# Patient Record
Sex: FEMALE | Race: WHITE | Employment: FULL TIME | URBAN - METROPOLITAN AREA
[De-identification: names, ages, dates, MRNs, and addresses within clinical notes are randomized per-mention and may not be internally consistent; named-entity substitution may affect disease eponyms.]

---

## 2018-01-31 ENCOUNTER — HOSPITAL ENCOUNTER (OUTPATIENT)
Dept: LAB | Age: 28
Discharge: HOME | End: 2018-01-31

## 2018-01-31 ENCOUNTER — OFFICE VISIT (OUTPATIENT)
Dept: OBGYN CLINIC | Age: 28
End: 2018-01-31

## 2018-01-31 VITALS
TEMPERATURE: 99.4 F | RESPIRATION RATE: 18 BRPM | DIASTOLIC BLOOD PRESSURE: 89 MMHG | HEIGHT: 64 IN | WEIGHT: 256.8 LBS | SYSTOLIC BLOOD PRESSURE: 124 MMHG | BODY MASS INDEX: 43.84 KG/M2 | HEART RATE: 78 BPM

## 2018-01-31 DIAGNOSIS — R87.610 ASCUS WITH POSITIVE HIGH RISK HPV CERVICAL: Primary | ICD-10-CM

## 2018-01-31 DIAGNOSIS — R87.810 ASCUS WITH POSITIVE HIGH RISK HPV CERVICAL: Primary | ICD-10-CM

## 2018-01-31 PROBLEM — E66.01 OBESITY, MORBID (HCC): Status: ACTIVE | Noted: 2018-01-31

## 2018-01-31 NOTE — PROCEDURES
Procedure Note: Colposcopy    Rashad Tsang is a No obstetric history on file. ,  32 y.o. female [facility] Patient's last menstrual period was 01/17/2018. She presents for colposcopy for evaluation of a cervical abnormality with a pap smear abnormality consisting of ASCUS and High risk HPV positive. After being presented with the risks, benefits and alternatives has she signed a consent for the procedure. She states that she understands the need for the procedure and has no further questions. She was informed that she may experience discomfort. Patient has no prior abnormal pap smears. She is a smoker, but is trying to [place]. Procedure:  She was positioned in the dorsal lithotomy position and a speculum was inserted into the vagina. Dilute acetic acid was applied to the cervix. The colposcope was used to visualize the cervix. Procedure: The transformation zone was completely visualized. Findings: This colposcopy was satisfactory. The procedure was notable for white epithelium on the cervix. Acetowhite changes were noted from around 12 to around 7 o'clock on the cervix. There were areas of hyperkeratosis and mosaicism at around 1 and 5 o'clock. There were several areas of acetowhite far from the transformation zone on the anterior lip of the cervix. Biopsies were taken from the cervix at 1 and 5 o'clock. Monsels was applied to the cervix. Hemostasis was noted to be excellent. Endocervical currettage: An endocervical curettage was performed. Post Procedure Status: The patient tolerated the procedure well with minimal discomfort. She was observed for 10 minutes and released in good condition. Will call patient with results and plan.

## 2018-01-31 NOTE — PROGRESS NOTES
Procedure Note: Colposcopy    Yelena Arnold is a No obstetric history on file. ,  32 y.o. female [facility] Patient's last menstrual period was 01/17/2018. She presents for colposcopy for evaluation of a cervical abnormality with a pap smear abnormality consisting of ASCUS and High risk HPV positive. After being presented with the risks, benefits and alternatives has she signed a consent for the procedure. She states that she understands the need for the procedure and has no further questions. She was informed that she may experience discomfort. Patient has no prior abnormal pap smears. She is a smoker, but is trying to [place]. Procedure:  She was positioned in the dorsal lithotomy position and a speculum was inserted into the vagina. Dilute acetic acid was applied to the cervix. The colposcope was used to visualize the cervix. Procedure: The transformation zone was completely visualized. Findings: This colposcopy was satisfactory. The procedure was notable for white epithelium on the cervix. Acetowhite changes were noted from around 12 to around 7 o'clock on the cervix. There were areas of hyperkeratosis and mosaicism at around 1 and 5 o'clock. There were several areas of acetowhite far from the transformation zone on the anterior lip of the cervix. Biopsies were taken from the cervix at 1 and 5 o'clock. Monsels was applied to the cervix. Hemostasis was noted to be excellent. Endocervical currettage: An endocervical curettage was performed. Post Procedure Status: The patient tolerated the procedure well with minimal discomfort. She was observed for 10 minutes and released in good condition. Will call patient with results and plan.

## 2018-01-31 NOTE — PROGRESS NOTES
Chief Complaint   Patient presents with    Colposcopy     Pt states \"bump on vagina\" for one week. Pt also states +Hpv. Pt can't keep weight off, hair growth, cycles somewhat irregular. Pt states she may have PCOS. Pregnancy test done negative results.

## 2018-02-01 LAB
HCG URINE, QL. (POC): NEGATIVE
VALID INTERNAL CONTROL?: YES

## 2018-02-13 NOTE — PROGRESS NOTES
Results of colposcopic biopsies showed low grade changes. Will repeat cotesting in 12 months. Thank you.

## 2018-02-14 ENCOUNTER — TELEPHONE (OUTPATIENT)
Dept: OBGYN CLINIC | Age: 28
End: 2018-02-14

## 2018-02-14 NOTE — TELEPHONE ENCOUNTER
Results of colposcopic biopsies showed low grade changes. Will repeat cotesting in 12 months. Unable to contact pt by phone. Letter sent.

## 2018-02-23 ENCOUNTER — TELEPHONE (OUTPATIENT)
Dept: OBGYN CLINIC | Age: 28
End: 2018-02-23

## 2018-08-31 ENCOUNTER — HOSPITAL ENCOUNTER (EMERGENCY)
Age: 28
Discharge: HOME | End: 2018-08-31
Attending: EMERGENCY MEDICINE
Payer: SELF-PAY

## 2018-08-31 ENCOUNTER — APPOINTMENT (OUTPATIENT)
Dept: GENERAL RADIOLOGY | Age: 28
End: 2018-08-31
Attending: PHYSICIAN ASSISTANT
Payer: SELF-PAY

## 2018-08-31 VITALS
TEMPERATURE: 98 F | DIASTOLIC BLOOD PRESSURE: 95 MMHG | OXYGEN SATURATION: 99 % | RESPIRATION RATE: 17 BRPM | BODY MASS INDEX: 45.05 KG/M2 | HEIGHT: 64 IN | HEART RATE: 91 BPM | WEIGHT: 263.89 LBS | SYSTOLIC BLOOD PRESSURE: 148 MMHG

## 2018-08-31 DIAGNOSIS — S39.012A BACK STRAIN, INITIAL ENCOUNTER: ICD-10-CM

## 2018-08-31 DIAGNOSIS — M54.6 ACUTE MIDLINE THORACIC BACK PAIN: Primary | ICD-10-CM
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
## 2018-08-31 DIAGNOSIS — R51.9 ACUTE NONINTRACTABLE HEADACHE, UNSPECIFIED HEADACHE TYPE: ICD-10-CM

## 2018-08-31 PROCEDURE — 74011250637 HC RX REV CODE- 250/637: Performed by: PHYSICIAN ASSISTANT

## 2018-08-31 PROCEDURE — 72072 X-RAY EXAM THORAC SPINE 3VWS: CPT

## 2018-08-31 PROCEDURE — 99283 EMERGENCY DEPT VISIT LOW MDM: CPT

## 2018-08-31 RX ORDER — CYCLOBENZAPRINE HCL 10 MG
10 TABLET ORAL
Status: COMPLETED | OUTPATIENT
Start: 2018-08-31 | End: 2018-08-31

## 2018-08-31 RX ORDER — HYDROCODONE BITARTRATE AND ACETAMINOPHEN 5; 325 MG/1; MG/1
1 TABLET ORAL
Status: COMPLETED | OUTPATIENT
Start: 2018-08-31 | End: 2018-08-31

## 2018-08-31 RX ORDER — METHOCARBAMOL 500 MG/1
500 TABLET, FILM COATED ORAL 4 TIMES DAILY
Qty: 20 TAB | Refills: 0 | Status: SHIPPED | OUTPATIENT
Start: 2018-08-31 | End: 2018-09-11 | Stop reason: ALTCHOICE

## 2018-08-31 RX ORDER — NAPROXEN 500 MG/1
500 TABLET ORAL 2 TIMES DAILY WITH MEALS
Qty: 20 TAB | Refills: 0 | Status: SHIPPED | OUTPATIENT
Start: 2018-08-31 | End: 2018-09-11 | Stop reason: SDUPTHER

## 2018-08-31 RX ORDER — NAPROXEN 250 MG/1
500 TABLET ORAL
Status: COMPLETED | OUTPATIENT
Start: 2018-08-31 | End: 2018-08-31

## 2018-08-31 RX ORDER — HYDROCODONE BITARTRATE AND ACETAMINOPHEN 5; 325 MG/1; MG/1
1 TABLET ORAL
Qty: 10 TAB | Refills: 0 | Status: SHIPPED | OUTPATIENT
Start: 2018-08-31 | End: 2018-09-11 | Stop reason: ALTCHOICE

## 2018-08-31 RX ADMIN — HYDROCODONE BITARTRATE AND ACETAMINOPHEN 1 TABLET: 5; 325 TABLET ORAL at 14:49

## 2018-08-31 RX ADMIN — CYCLOBENZAPRINE 10 MG: 10 TABLET, FILM COATED ORAL at 14:49

## 2018-08-31 RX ADMIN — NAPROXEN 500 MG: 250 TABLET ORAL at 14:49

## 2018-08-31 NOTE — DISCHARGE INSTRUCTIONS
Thank you!     Thank you for allowing us to provide you with excellent care today. We hope we addressed all of your concerns and needs. We strive to provide excellent quality care in the Emergency Department. You will receive a survey after your visit to evaluate the care you were provided.      Please rate us a level 5 (excellent), as anything less than excellent does not meet our goals.      If you feel that you have not received excellent quality care or timely care, please ask to speak to the nurse manager. Please choose us in the future for your continued health care needs. ______________________________________________________________________    The exam and treatment you received in the Emergency Department were for an urgent problem and are not intended as complete care. It is important that you follow-up with a doctor, nurse practitioner, or physician assistant to:  (1) confirm your diagnosis,  (2) re-evaluation of changes in your illness and treatment, and  (3) for ongoing care. If your symptoms become worse or you do not improve as expected and you are unable to reach your usual health care provider, you should return to the Emergency Department. We are available 24 hours a day. Take this sheet with you when you go to your follow-up visit. If you have any problem arranging the follow-up visit, contact [address] [place] [address] 21 [phone])    Make an appointment with your Primary Care doctor for follow up of this visit. Return to the ER if you are unable to be seen in the time recommended on your discharge instructions. Back Pain: Care Instructions  Your Care Instructions    Back pain has many possible causes. It is often related to problems with muscles and ligaments of the back. It may also be related to problems with the nerves, discs, or bones of the back. Moving, lifting, standing, sitting, or sleeping in an awkward way can strain the back. Sometimes you don't notice the injury until later.  Arthritis is another common cause of back pain. Although it may hurt a lot, back pain usually improves on its own within several weeks. Most people recover in 12 weeks or less. Using good home treatment and being careful not to stress your back can help you feel better sooner. Follow-up care is a key part of your treatment and safety. Be sure to make and go to all appointments, and call your doctor if you are having problems. It's also a good idea to know your test results and keep a list of the medicines you take. How can you care for yourself at home? · Sit or lie in positions that are most comfortable and reduce your pain. Try one of these positions when you lie down:  ¨ Lie on your back with your knees bent and supported by large pillows. ¨ Lie on the floor with your legs on the seat of a sofa or chair. Jolyne Laser on your side with your knees and hips bent and a pillow between your legs. ¨ Lie on your stomach if it does not make pain worse. · Do not sit up in bed, and avoid soft couches and twisted positions. Bed rest can help relieve pain at first, but it delays healing. Avoid bed rest after the first day of back pain. · Change positions every 30 minutes. If you must sit for long periods of time, take breaks from sitting. Get up and walk around, or lie in a comfortable position. · Try using a heating pad on a low or medium setting for 15 to 20 minutes every 2 or 3 hours. Try a warm shower in place of one session with the heating pad. · You can also try an ice pack for 10 to 15 minutes every 2 to 3 hours. Put a thin cloth between the ice pack and your skin. · Take pain medicines exactly as directed. ¨ If the doctor gave you a prescription medicine for pain, take it as prescribed. ¨ If you are not taking a prescription pain medicine, ask your doctor if you can take an over-the-counter medicine. · Take short walks several times a day.  You can start with 5 to 10 minutes, 3 or 4 times a day, and work up to longer walks. Walk on level surfaces and avoid hills and stairs until your back is better. · Return to work and other activities as soon as you can. Continued rest without activity is usually not good for your back. · To prevent future back pain, do exercises to stretch and strengthen your back and stomach. Learn how to use good posture, safe lifting techniques, and proper body mechanics. When should you call for help? Call your doctor now or seek immediate medical care if:    · You have new or worsening numbness in your legs.     · You have new or worsening weakness in your legs. (This could make it hard to stand up.)     · You lose control of your bladder or bowels.    Watch closely for changes in your health, and be sure to contact your doctor if:    · You have a fever, lose weight, or don't feel well.     · You do not get better as expected. Where can you learn more? Go to http://ellie-oscar.info/. Enter O174 in the search box to learn more about \"Back Pain: Care Instructions. \"  Current as of: November 29, 2017  Content Version: 11.7  © 6081-5020 [employer]. Care instructions adapted under license by [employer] (which disclaims liability or warranty for this information). If you have questions about a medical condition or this instruction, always ask your healthcare professional. [address] any warranty or liability for your use of this information.

## 2018-08-31 NOTE — LETTER
[address] 
[facility] EMERGENCY DEPT 
[address] [place] [address] [address] [id number] 
[phone] Work/School Note Date: 8/31/2018 To Whom It May concern: 
 
Néstor Packer was seen and treated today in the emergency room by the following provider(s): 
Attending Provider: Eden Cheek MD 
Physician Assistant: Gaurang Walter PA-C. Néstor Packer may return to work on 3 September 2018. Sincerely, Gaurang Walter PA-C
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
## 2018-08-31 NOTE — ED NOTES
Discharge instructions reviewed with patient, copy given by EDWARD Trejo. Pt is accomponied by family, denies use of wheelchair.

## 2018-09-11 ENCOUNTER — OFFICE VISIT (OUTPATIENT)
Dept: FAMILY MEDICINE CLINIC | Age: 28
End: 2018-09-11

## 2018-09-11 VITALS
RESPIRATION RATE: 20 BRPM | HEIGHT: 64 IN | SYSTOLIC BLOOD PRESSURE: 135 MMHG | WEIGHT: 268 LBS | OXYGEN SATURATION: 98 % | DIASTOLIC BLOOD PRESSURE: 89 MMHG | TEMPERATURE: 98.1 F | BODY MASS INDEX: 45.75 KG/M2 | HEART RATE: 83 BPM

## 2018-09-11 DIAGNOSIS — S29.012D UPPER BACK STRAIN, SUBSEQUENT ENCOUNTER: Primary | ICD-10-CM

## 2018-09-11 DIAGNOSIS — M79.601 PAIN, ARM, RIGHT: ICD-10-CM

## 2018-09-11 DIAGNOSIS — M25.532 PAIN, WRIST, LEFT: ICD-10-CM

## 2018-09-11 RX ORDER — NAPROXEN 500 MG/1
500 TABLET ORAL 2 TIMES DAILY WITH MEALS
Qty: 20 TAB | Refills: 0 | Status: SHIPPED | OUTPATIENT
Start: 2018-09-11 | End: 2018-09-21

## 2018-09-11 RX ORDER — METHOCARBAMOL 500 MG/1
500 TABLET, FILM COATED ORAL 4 TIMES DAILY
Qty: 20 TAB | Refills: 0 | Status: SHIPPED | OUTPATIENT
Start: 2018-09-11 | End: 2018-09-26 | Stop reason: SDUPTHER

## 2018-09-11 RX ORDER — HYDROCODONE BITARTRATE AND ACETAMINOPHEN 5; 325 MG/1; MG/1
1 TABLET ORAL
Qty: 10 TAB | Refills: 0 | Status: SHIPPED | OUTPATIENT
Start: 2018-09-11 | End: 2018-09-26 | Stop reason: SDUPTHER
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
## 2018-09-11 NOTE — PATIENT INSTRUCTIONS
Whiplash: Care Instructions Your Care Instructions Whiplash occurs when your head is suddenly forced forward and then snapped backward, as might happen in a car accident or sports injury. This can cause pain and stiffness in your neck. Your head, chest, shoulders, and arms also may hurt. Most whiplash gets better with home care. Your doctor may advise you to take medicine to relieve pain or relax your muscles. He or she may suggest exercise and physical therapy to increase flexibility and relieve pain. You can try wearing a neck (cervical) collar to support your neck. For a while you probably will need to avoid lifting and other activities that can strain the neck. Follow-up care is a key part of your treatment and safety. Be sure to make and go to all appointments, and call your doctor if you are having problems. It's also a good idea to know your test results and keep a list of the medicines you take. How can you care for yourself at home? · Take pain medicines exactly as directed. ¨ If the doctor gave you a prescription medicine for pain, take it as prescribed. ¨ If you are not taking a prescription pain medicine, ask your doctor if you can take an over-the-counter medicine. ¨ Do not take two or more pain medicines at the same time unless the doctor told you to. Many pain medicines have acetaminophen, which is Tylenol. Too much acetaminophen (Tylenol) can be harmful. · You can try using a soft foam collar to support your neck for short periods of time. You can buy one at most drugstores. Do not wear the collar more than 2 or 3 days unless your doctor tells you to. · You can try using heat and ice to see if it helps. ¨ Try using a heating pad on a low or medium setting for 15 to 20 minutes every 2 to 3 hours. Try a warm shower in place of one session with the heating pad. You can also buy single-use heat wraps that last up to 8 hours. ¨ You can also try an ice pack for 10 to 15 minutes every 2 to 3 hours. · Do not do anything that makes the pain worse. Take it easy for a couple of days. You can do your usual activities if they do not hurt your neck or put it at risk for more stress or injury. Avoid lifting, sports, or other activities that might strain your neck. · Try sleeping on a special neck pillow. Place it under your neck, not under your head. Placing a tightly rolled-up towel under your neck while you sleep will also work. If you use a neck pillow or rolled towel, do not use your regular pillow at the same time. · Once your neck pain is gone, do exercises to stretch your neck and back and make them stronger. Your doctor or physical therapist can tell you which exercises are best. 
When should you call for help? Call 911 anytime you think you may need emergency care. For example, call if: 
  · You are unable to move an arm or a leg at all.  
[facility] your doctor now or seek immediate medical care if: 
  · You have new or worse symptoms in your arms, legs, chest, belly, or buttocks. Symptoms may include: ¨ Numbness or tingling. ¨ Weakness. ¨ Pain.  
  · You lose bladder or bowel control.  
 Watch closely for changes in your health, and be sure to contact your doctor if: 
  · You are not getting better as expected. Where can you learn more? Go to http://ellie-oscar.info/. Enter H289 in the search box to learn more about \"Whiplash: Care Instructions. \" Current as of: November 29, 2017 Content Version: 11.7 © 7409-8733 [employer]. Care instructions adapted under license by [employer] (which disclaims liability or warranty for this information). If you have questions about a medical condition or this instruction, always ask your healthcare professional. [address] any warranty or liability for your use of this information.
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
## 2018-09-11 NOTE — LETTER
NOTIFICATION RETURN TO WORK / SCHOOL 
 
9/11/2018 2:56 PM 
 
Ms. Les James [address] [place] [id number] To Whom It May Concern: 
 
Les James is currently under the care of [facility]. She was seen in the office today. If there are questions or concerns please have the patient contact our office. Sincerely, Elisa Soto MD

## 2018-09-26 ENCOUNTER — OFFICE VISIT (OUTPATIENT)
Dept: FAMILY MEDICINE CLINIC | Age: 28
End: 2018-09-26

## 2018-09-26 VITALS
SYSTOLIC BLOOD PRESSURE: 130 MMHG | BODY MASS INDEX: 46.61 KG/M2 | WEIGHT: 273 LBS | TEMPERATURE: 97.9 F | HEIGHT: 64 IN | DIASTOLIC BLOOD PRESSURE: 84 MMHG | OXYGEN SATURATION: 99 % | RESPIRATION RATE: 20 BRPM | HEART RATE: 98 BPM

## 2018-09-26 DIAGNOSIS — S29.012D UPPER BACK STRAIN, SUBSEQUENT ENCOUNTER: ICD-10-CM

## 2018-09-26 DIAGNOSIS — M25.521 RIGHT ELBOW PAIN: Primary | ICD-10-CM

## 2018-09-26 DIAGNOSIS — M25.532 PAIN, WRIST, LEFT: ICD-10-CM

## 2018-09-26 DIAGNOSIS — M79.601 PAIN, ARM, RIGHT: ICD-10-CM

## 2018-09-26 RX ORDER — METHOCARBAMOL 500 MG/1
500 TABLET, FILM COATED ORAL 4 TIMES DAILY
Qty: 20 TAB | Refills: 0 | Status: SHIPPED | OUTPATIENT
Start: 2018-09-26 | End: 2018-10-09 | Stop reason: SDUPTHER

## 2018-09-26 RX ORDER — NAPROXEN 500 MG/1
500 TABLET ORAL 2 TIMES DAILY
Qty: 120 TAB | Refills: 0 | Status: SHIPPED | OUTPATIENT
Start: 2018-09-26 | End: 2018-10-09 | Stop reason: SDUPTHER

## 2018-09-26 RX ORDER — HYDROCODONE BITARTRATE AND ACETAMINOPHEN 5; 325 MG/1; MG/1
1 TABLET ORAL
Qty: 10 TAB | Refills: 0 | Status: SHIPPED | OUTPATIENT
Start: 2018-09-26 | End: 2022-05-22
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
## 2018-09-26 NOTE — LETTER
NOTIFICATION RETURN TO WORK / SCHOOL 
 
9/26/2018 4:01 PM 
 
Ms. Emili Laura [address] [place] [id number] To Whom It May Concern: 
 
Emili Laura is currently under the care of [facility]. She was seen in the office today. If there are questions or concerns please have the patient contact our office. Sincerely, Terra Hutchison MD

## 2018-09-26 NOTE — PATIENT INSTRUCTIONS
Whiplash: Care Instructions Your Care Instructions Whiplash occurs when your head is suddenly forced forward and then snapped backward, as might happen in a car accident or sports injury. This can cause pain and stiffness in your neck. Your head, chest, shoulders, and arms also may hurt. Most whiplash gets better with home care. Your doctor may advise you to take medicine to relieve pain or relax your muscles. He or she may suggest exercise and physical therapy to increase flexibility and relieve pain. You can try wearing a neck (cervical) collar to support your neck. For a while you probably will need to avoid lifting and other activities that can strain the neck. Follow-up care is a key part of your treatment and safety. Be sure to make and go to all appointments, and call your doctor if you are having problems. It's also a good idea to know your test results and keep a list of the medicines you take. How can you care for yourself at home? · Take pain medicines exactly as directed. ¨ If the doctor gave you a prescription medicine for pain, take it as prescribed. ¨ If you are not taking a prescription pain medicine, ask your doctor if you can take an over-the-counter medicine. ¨ Do not take two or more pain medicines at the same time unless the doctor told you to. Many pain medicines have acetaminophen, which is Tylenol. Too much acetaminophen (Tylenol) can be harmful. · You can try using a soft foam collar to support your neck for short periods of time. You can buy one at most drugstores. Do not wear the collar more than 2 or 3 days unless your doctor tells you to. · You can try using heat and ice to see if it helps. ¨ Try using a heating pad on a low or medium setting for 15 to 20 minutes every 2 to 3 hours. Try a warm shower in place of one session with the heating pad. You can also buy single-use heat wraps that last up to 8 hours. ¨ You can also try an ice pack for 10 to 15 minutes every 2 to 3 hours. · Do not do anything that makes the pain worse. Take it easy for a couple of days. You can do your usual activities if they do not hurt your neck or put it at risk for more stress or injury. Avoid lifting, sports, or other activities that might strain your neck. · Try sleeping on a special neck pillow. Place it under your neck, not under your head. Placing a tightly rolled-up towel under your neck while you sleep will also work. If you use a neck pillow or rolled towel, do not use your regular pillow at the same time. · Once your neck pain is gone, do exercises to stretch your neck and back and make them stronger. Your doctor or physical therapist can tell you which exercises are best. 
When should you call for help? Call 911 anytime you think you may need emergency care. For example, call if: 
  · You are unable to move an arm or a leg at all.  
[facility] your doctor now or seek immediate medical care if: 
  · You have new or worse symptoms in your arms, legs, chest, belly, or buttocks. Symptoms may include: ¨ Numbness or tingling. ¨ Weakness. ¨ Pain.  
  · You lose bladder or bowel control.  
 Watch closely for changes in your health, and be sure to contact your doctor if: 
  · You are not getting better as expected. Where can you learn more? Go to http://ellie-oscar.info/. Enter M499 in the search box to learn more about \"Whiplash: Care Instructions. \" Current as of: November 29, 2017 Content Version: 11.7 © 3384-7016 [employer]. Care instructions adapted under license by [employer] [facility] (which disclaims liability or warranty for this information). If you have questions about a medical condition or this instruction, always ask your healthcare professional. [address] any warranty or liability for your use of this information.
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
## 2018-10-09 DIAGNOSIS — M79.601 PAIN, ARM, RIGHT: ICD-10-CM

## 2018-10-09 DIAGNOSIS — M25.521 RIGHT ELBOW PAIN: ICD-10-CM

## 2018-10-09 DIAGNOSIS — M25.532 PAIN, WRIST, LEFT: ICD-10-CM

## 2018-10-09 DIAGNOSIS — S29.012D UPPER BACK STRAIN, SUBSEQUENT ENCOUNTER: ICD-10-CM

## 2018-10-09 NOTE — TELEPHONE ENCOUNTER
From: Francia Cedeno  To: Darion Fernandez MD  Sent: 10/9/2018 1:54 PM EDT  Subject: Medication Renewal Request    Original authorizing provider: MD Francia Aguilera would like a refill of the following medications:  HYDROcodone-acetaminophen (NORCO) 5-325 mg per tablet [Darren Murguia MD]  methocarbamol (ROBAXIN) 500 mg tablet [Darren Murguia MD]  naproxen (NAPROSYN) 500 mg tablet [Darren Boyle MD]    Preferred pharmacy: [facility] [address]    Comment:

## 2018-10-09 NOTE — TELEPHONE ENCOUNTER
Last Visit: 9/26  Next Appt: none  Previous Refill Encounter: Norco-10+0 (9/26)      Requested Prescriptions     Pending Prescriptions Disp Refills    HYDROcodone-acetaminophen (NORCO) 5-325 mg per tablet 10 Tab 0     Sig: Take 1 Tab by mouth every six (6) hours as needed for Pain. Max Daily Amount: 4 Tabs.  methocarbamol (ROBAXIN) 500 mg tablet 20 Tab 0     Sig: Take 1 Tab by mouth four (4) times daily.  naproxen (NAPROSYN) 500 mg tablet 120 Tab 0     Sig: Take 1 Tab by mouth two (2) times a day. Take 1 Tab by mouth two (2) times a day.  As needed  Indications: Pain

## 2018-10-12 RX ORDER — NAPROXEN 500 MG/1
500 TABLET ORAL 2 TIMES DAILY
Qty: 120 TAB | Refills: 0 | Status: SHIPPED | OUTPATIENT
Start: 2018-10-12 | End: 2022-05-22

## 2018-10-12 RX ORDER — HYDROCODONE BITARTRATE AND ACETAMINOPHEN 5; 325 MG/1; MG/1
1 TABLET ORAL
Qty: 10 TAB | Refills: 0 | OUTPATIENT
Start: 2018-10-12

## 2018-10-12 RX ORDER — METHOCARBAMOL 500 MG/1
500 TABLET, FILM COATED ORAL 4 TIMES DAILY
Qty: 20 TAB | Refills: 0 | Status: SHIPPED | OUTPATIENT
Start: 2018-10-12 | End: 2018-12-11 | Stop reason: SDUPTHER

## 2018-12-11 DIAGNOSIS — M79.601 PAIN, ARM, RIGHT: ICD-10-CM

## 2018-12-11 DIAGNOSIS — M25.521 RIGHT ELBOW PAIN: ICD-10-CM

## 2018-12-11 DIAGNOSIS — S29.012D UPPER BACK STRAIN, SUBSEQUENT ENCOUNTER: ICD-10-CM

## 2018-12-11 DIAGNOSIS — M25.532 PAIN, WRIST, LEFT: ICD-10-CM

## 2018-12-12 RX ORDER — METHOCARBAMOL 500 MG/1
500 TABLET, FILM COATED ORAL 4 TIMES DAILY
Qty: 20 TAB | Refills: 0 | Status: SHIPPED | OUTPATIENT
Start: 2018-12-12 | End: 2019-03-07 | Stop reason: SDUPTHER

## 2019-03-07 DIAGNOSIS — M79.601 PAIN, ARM, RIGHT: ICD-10-CM

## 2019-03-07 DIAGNOSIS — M25.521 RIGHT ELBOW PAIN: ICD-10-CM

## 2019-03-07 DIAGNOSIS — M25.532 PAIN, WRIST, LEFT: ICD-10-CM

## 2019-03-07 DIAGNOSIS — S29.012D UPPER BACK STRAIN, SUBSEQUENT ENCOUNTER: ICD-10-CM

## 2019-03-08 RX ORDER — METHOCARBAMOL 500 MG/1
500 TABLET, FILM COATED ORAL 4 TIMES DAILY
Qty: 20 TAB | Refills: 0 | Status: SHIPPED | OUTPATIENT
Start: 2019-03-08 | End: 2022-05-22

## 2021-10-21 LAB
ANTIBODY SCREEN, EXTERNAL: NEGATIVE
CHLAMYDIA, EXTERNAL: NEGATIVE
HBSAG, EXTERNAL: NEGATIVE
HIV, EXTERNAL: NORMAL
N. GONORRHEA, EXTERNAL: NEGATIVE
RUBELLA, EXTERNAL: NORMAL
T. PALLIDUM, EXTERNAL: NORMAL
TYPE, ABO & RH, EXTERNAL: NORMAL

## 2022-03-19 PROBLEM — E66.01 OBESITY, MORBID (HCC): Status: ACTIVE | Noted: 2018-01-31

## 2022-05-04 LAB — GRBS, EXTERNAL: POSITIVE

## 2022-05-18 ENCOUNTER — HOSPITAL ENCOUNTER (INPATIENT)
Age: 32
LOS: 4 days | Discharge: HOME | End: 2022-05-22
Attending: OBSTETRICS & GYNECOLOGY | Admitting: OBSTETRICS & GYNECOLOGY
Payer: COMMERCIAL

## 2022-05-18 DIAGNOSIS — Z98.891 S/P C-SECTION: Primary | ICD-10-CM

## 2022-05-18 PROBLEM — O24.419 GESTATIONAL DIABETES: Status: ACTIVE | Noted: 2022-05-18

## 2022-05-18 LAB
ABO + RH BLD: NORMAL
ALBUMIN SERPL-MCNC: 2.3 G/DL (ref 3.5–5)
ALBUMIN/GLOB SERPL: 0.6 {RATIO} (ref 1.1–2.2)
ALP SERPL-CCNC: 149 U/L (ref 45–117)
ALT SERPL-CCNC: 12 U/L (ref 12–78)
AMPHET UR QL SCN: NEGATIVE
ANION GAP SERPL CALC-SCNC: 5 MMOL/L (ref 5–15)
AST SERPL-CCNC: 15 U/L (ref 15–37)
BARBITURATES UR QL SCN: NEGATIVE
BASOPHILS # BLD: 0 K/UL (ref 0–0.1)
BASOPHILS NFR BLD: 0 % (ref 0–1)
BENZODIAZ UR QL: NEGATIVE
BILIRUB SERPL-MCNC: 0.2 MG/DL (ref 0.2–1)
BLOOD GROUP ANTIBODIES SERPL: NORMAL
BUN SERPL-MCNC: 8 MG/DL (ref 6–20)
BUN/CREAT SERPL: 17 (ref 12–20)
CALCIUM SERPL-MCNC: 8.8 MG/DL (ref 8.5–10.1)
CANNABINOIDS UR QL SCN: POSITIVE
CHLORIDE SERPL-SCNC: 109 MMOL/L (ref 97–108)
CO2 SERPL-SCNC: 23 MMOL/L (ref 21–32)
COCAINE UR QL SCN: NEGATIVE
CREAT SERPL-MCNC: 0.46 MG/DL (ref 0.55–1.02)
CREAT UR-MCNC: 58.6 MG/DL
DIFFERENTIAL METHOD BLD: ABNORMAL
DRUG SCRN COMMENT,DRGCM: ABNORMAL
EOSINOPHIL # BLD: 0.1 K/UL (ref 0–0.4)
EOSINOPHIL NFR BLD: 1 % (ref 0–7)
ERYTHROCYTE [DISTWIDTH] IN BLOOD BY AUTOMATED COUNT: 13.9 % (ref 11.5–14.5)
GLOBULIN SER CALC-MCNC: 4.1 G/DL (ref 2–4)
GLUCOSE BLD STRIP.AUTO-MCNC: 138 MG/DL (ref 65–117)
GLUCOSE SERPL-MCNC: 87 MG/DL (ref 65–100)
HCT VFR BLD AUTO: 33.3 % (ref 35–47)
HGB BLD-MCNC: 11 G/DL (ref 11.5–16)
IMM GRANULOCYTES # BLD AUTO: 0 K/UL (ref 0–0.04)
IMM GRANULOCYTES NFR BLD AUTO: 1 % (ref 0–0.5)
LYMPHOCYTES # BLD: 1.2 K/UL (ref 0.8–3.5)
LYMPHOCYTES NFR BLD: 15 % (ref 12–49)
MCH RBC QN AUTO: 28.6 PG (ref 26–34)
MCHC RBC AUTO-ENTMCNC: 33 G/DL (ref 30–36.5)
MCV RBC AUTO: 86.7 FL (ref 80–99)
METHADONE UR QL: NEGATIVE
MONOCYTES # BLD: 0.5 K/UL (ref 0–1)
MONOCYTES NFR BLD: 6 % (ref 5–13)
NEUTS SEG # BLD: 6.4 K/UL (ref 1.8–8)
NEUTS SEG NFR BLD: 77 % (ref 32–75)
NRBC # BLD: 0 K/UL (ref 0–0.01)
NRBC BLD-RTO: 0 PER 100 WBC
OPIATES UR QL: NEGATIVE
PCP UR QL: NEGATIVE
PLATELET # BLD AUTO: 335 K/UL (ref 150–400)
PMV BLD AUTO: 11.2 FL (ref 8.9–12.9)
POTASSIUM SERPL-SCNC: 3.8 MMOL/L (ref 3.5–5.1)
PROT SERPL-MCNC: 6.4 G/DL (ref 6.4–8.2)
PROT UR-MCNC: 10 MG/DL (ref 0–11.9)
PROT/CREAT UR-RTO: 0.2
RBC # BLD AUTO: 3.84 M/UL (ref 3.8–5.2)
SERVICE CMNT-IMP: ABNORMAL
SODIUM SERPL-SCNC: 137 MMOL/L (ref 136–145)
SPECIMEN EXP DATE BLD: NORMAL
WBC # BLD AUTO: 8.3 K/UL (ref 3.6–11)

## 2022-05-18 PROCEDURE — 76060000078 HC EPIDURAL ANESTHESIA: Performed by: STUDENT IN AN ORGANIZED HEALTH CARE EDUCATION/TRAINING PROGRAM

## 2022-05-18 PROCEDURE — 86900 BLOOD TYPING SEROLOGIC ABO: CPT

## 2022-05-18 PROCEDURE — 74011250637 HC RX REV CODE- 250/637: Performed by: OBSTETRICS & GYNECOLOGY

## 2022-05-18 PROCEDURE — 76060000033 HC ANESTHESIA 1 TO 1.5 HR: Performed by: STUDENT IN AN ORGANIZED HEALTH CARE EDUCATION/TRAINING PROGRAM

## 2022-05-18 PROCEDURE — 80053 COMPREHEN METABOLIC PANEL: CPT

## 2022-05-18 PROCEDURE — 76010000391 HC C SECN FIRST 1 HR: Performed by: STUDENT IN AN ORGANIZED HEALTH CARE EDUCATION/TRAINING PROGRAM

## 2022-05-18 PROCEDURE — 85025 COMPLETE CBC W/AUTO DIFF WBC: CPT

## 2022-05-18 PROCEDURE — 3E0DXGC INTRODUCTION OF OTHER THERAPEUTIC SUBSTANCE INTO MOUTH AND PHARYNX, EXTERNAL APPROACH: ICD-10-PCS | Performed by: STUDENT IN AN ORGANIZED HEALTH CARE EDUCATION/TRAINING PROGRAM

## 2022-05-18 PROCEDURE — 36415 COLL VENOUS BLD VENIPUNCTURE: CPT

## 2022-05-18 PROCEDURE — 4A1HXCZ MONITORING OF PRODUCTS OF CONCEPTION, CARDIAC RATE, EXTERNAL APPROACH: ICD-10-PCS | Performed by: STUDENT IN AN ORGANIZED HEALTH CARE EDUCATION/TRAINING PROGRAM

## 2022-05-18 PROCEDURE — 76010000392 HC C SECN EA ADDL 0.5 HR: Performed by: STUDENT IN AN ORGANIZED HEALTH CARE EDUCATION/TRAINING PROGRAM

## 2022-05-18 PROCEDURE — 65410000002 HC RM PRIVATE OB

## 2022-05-18 PROCEDURE — 75410000002 HC LABOR FEE PER 1 HR

## 2022-05-18 PROCEDURE — 84156 ASSAY OF PROTEIN URINE: CPT

## 2022-05-18 PROCEDURE — 80307 DRUG TEST PRSMV CHEM ANLYZR: CPT

## 2022-05-18 PROCEDURE — 74011250636 HC RX REV CODE- 250/636: Performed by: OBSTETRICS & GYNECOLOGY

## 2022-05-18 PROCEDURE — 82962 GLUCOSE BLOOD TEST: CPT

## 2022-05-18 RX ORDER — OXYTOCIN/RINGER'S LACTATE 30/500 ML
10 PLASTIC BAG, INJECTION (ML) INTRAVENOUS AS NEEDED
Status: DISCONTINUED | OUTPATIENT
Start: 2022-05-18 | End: 2022-05-22 | Stop reason: HOSPADM

## 2022-05-18 RX ORDER — BUTORPHANOL TARTRATE 2 MG/ML
2 INJECTION INTRAMUSCULAR; INTRAVENOUS
Status: DISCONTINUED | OUTPATIENT
Start: 2022-05-18 | End: 2022-05-22 | Stop reason: HOSPADM

## 2022-05-18 RX ORDER — OXYTOCIN/RINGER'S LACTATE 30/500 ML
87.3 PLASTIC BAG, INJECTION (ML) INTRAVENOUS AS NEEDED
Status: DISCONTINUED | OUTPATIENT
Start: 2022-05-18 | End: 2022-05-22 | Stop reason: HOSPADM

## 2022-05-18 RX ORDER — METFORMIN HYDROCHLORIDE 1000 MG/1
TABLET, FILM COATED, EXTENDED RELEASE ORAL
COMMUNITY
End: 2022-05-22

## 2022-05-18 RX ORDER — SODIUM CHLORIDE 0.9 % (FLUSH) 0.9 %
5-40 SYRINGE (ML) INJECTION EVERY 8 HOURS
Status: DISCONTINUED | OUTPATIENT
Start: 2022-05-18 | End: 2022-05-19

## 2022-05-18 RX ORDER — NALOXONE HYDROCHLORIDE 0.4 MG/ML
0.4 INJECTION, SOLUTION INTRAMUSCULAR; INTRAVENOUS; SUBCUTANEOUS AS NEEDED
Status: DISCONTINUED | OUTPATIENT
Start: 2022-05-18 | End: 2022-05-19 | Stop reason: HOSPADM

## 2022-05-18 RX ORDER — SODIUM CHLORIDE 0.9 % (FLUSH) 0.9 %
5-40 SYRINGE (ML) INJECTION AS NEEDED
Status: DISCONTINUED | OUTPATIENT
Start: 2022-05-18 | End: 2022-05-19

## 2022-05-18 RX ORDER — ZOLPIDEM TARTRATE 5 MG/1
5 TABLET ORAL
Status: DISCONTINUED | OUTPATIENT
Start: 2022-05-18 | End: 2022-05-22 | Stop reason: HOSPADM

## 2022-05-18 RX ORDER — ONDANSETRON 2 MG/ML
4 INJECTION INTRAMUSCULAR; INTRAVENOUS
Status: DISCONTINUED | OUTPATIENT
Start: 2022-05-18 | End: 2022-05-19 | Stop reason: HOSPADM

## 2022-05-18 RX ORDER — SODIUM CHLORIDE, SODIUM LACTATE, POTASSIUM CHLORIDE, CALCIUM CHLORIDE 600; 310; 30; 20 MG/100ML; MG/100ML; MG/100ML; MG/100ML
125 INJECTION, SOLUTION INTRAVENOUS CONTINUOUS
Status: DISCONTINUED | OUTPATIENT
Start: 2022-05-18 | End: 2022-05-22 | Stop reason: HOSPADM

## 2022-05-18 RX ADMIN — Medication 25 MCG: at 17:30

## 2022-05-18 RX ADMIN — Medication 25 MCG: at 22:08

## 2022-05-18 RX ADMIN — SODIUM CHLORIDE, POTASSIUM CHLORIDE, SODIUM LACTATE AND CALCIUM CHLORIDE 999 ML/HR: 600; 310; 30; 20 INJECTION, SOLUTION INTRAVENOUS at 15:55

## 2022-05-18 RX ADMIN — ZOLPIDEM TARTRATE 5 MG: 5 TABLET ORAL at 23:06

## 2022-05-18 NOTE — H&P
History & Physical    Name: Ainsley Guerrero MRN: [id number]  SSN: [id number]    YOB: 1990  Age: 32 y.o. Sex: female      Subjective:     Estimated Date of Delivery: 22  OB History    Para Term  AB Living   1             SAB IAB Ectopic Molar Multiple Live Births                    # Outcome Date GA Lbr David/2nd Weight Sex Delivery Anes PTL Lv   1 Current                Ms. William Beauchamp is admitted with pregnancy at 38w0d for induction of labor due to gestational diabetes with suboptimal control on metformin (pt noncompliant with insulin) and low CONOR today. She denies contractions, vaginal bleeding, or LOF. Reports active FM. Prenatal course c/b  - gDMA2, noncompliant with insulin, on metformin 1000mg qHS  - morbid obesity, BMI 47.8 +23lbs  - GBS positive  - hx anxiety/depressin, no meds  - Rh neg s/p RhIg    Please see prenatal records for details. No past medical history on file. Past Surgical History:   Procedure Laterality Date    HX TONSIL AND ADENOIDECTOMY      HX WISDOM TEETH EXTRACTION       Social History     Occupational History    Not on file   Tobacco Use    Smoking status: Current Every Day Smoker    Smokeless tobacco: Never Used   Substance and Sexual Activity    Alcohol use: Yes     Comment: occ    Drug use: No    Sexual activity: Yes     Partners: Male     Birth control/protection: Condom     Family History   Problem Relation Age of Onset    Other Sister         PCOS    Diabetes Maternal Aunt     Diabetes Maternal Uncle     Cancer Maternal Uncle         skin    Colon Cancer Maternal Grandfather        Allergies   Allergen Reactions    Percocet [Oxycodone-Acetaminophen] Nausea and Vomiting     Causes headaches also. Prior to Admission medications    Medication Sig Start Date End Date Taking? Authorizing Provider   methocarbamol (ROBAXIN) 500 mg tablet Take 1 Tab by mouth four (4) times daily.  3/8/19   Carola Murguia MD   naproxen (NAPROSYN) 500 mg tablet Take 1 Tab by mouth two (2) times a day. Take 1 Tab by mouth two (2) times a day. As needed  Indications: Pain 10/12/18   Bri Murguia MD   HYDROcodone-acetaminophen (NORCO) 5-325 mg per tablet Take 1 Tab by mouth every six (6) hours as needed for Pain. Max Daily Amount: 4 Tabs. 18   Charanjit Resendiz MD        Review of Systems: A comprehensive review of systems was negative except for that written in the History of Present Illness. Objective:     Vitals:  /101  HR 92   SpO2 97% ORA    Physical Exam:  Patient without distress. Heart: Regular rate and rhythm  Lung: normal respiratory effort  Abdomen: soft, nontender, gravid  Perineum: blood absent, amniotic fluid absent  Cervical Exam: 0/50/-3, checked by me in office prior to arrival   Lower Extremities: no calf tenderness  Membranes:  Intact  Fetal Heart Rate: 160s, moderate variability, reactive accels, no decels  [place]: rare ctx, less than 1 in 10 min, pt denies           US today in office - CONOR 5.3, vertex, posterior fundal placenta  Last growth @ 36wks 68th% (7579B)    Prenatal Labs:   No results found for: ABORH, RUBELLAEXT, HBSAGEXT, HIVEXT, RPREXT, GONNOEXT, CHLAMEXT, ABORHEXT, RUBELLAEXT, GRBSEXT, HBSAGEXT, HIVEXT, RPREXT, GONNOEXT, CHLAMEXT    Impression/Plan:     Active Problems:    Gestational diabetes (2022)     32 y.o.  at 38w0d here for IOL for gestational diabetes with suboptimal control on metformin (pt noncompliant with insulin) and low CONOR today. GBS positive. Pregnancy additionally complicated by obesity, RH neg, and mood disorder. Plan: Admit for induction of labor. Group B Strep positive, will treat prophylactically with penicillin.     - plan ripening overnight with cytotec, q4hrs buccal  - monitoring overnight per cytotec protocol  - plan pitocin in AM  - start PCN at midnight for GBS ppx  - check fingersticks q4hrs overnight, q2 hours in latent labor, q1 hr in active labor    On arrival, BP noted to be elevated 140/100s, will send [address] labs with admission labs.   - continue to monitor BP closely, follow up labs   - discussed indication for magnesium sulfate for seizure ppx in context of preE with severe features    Sheila Ellsworth MD  5/18/2022  4:20 PM

## 2022-05-18 NOTE — PROGRESS NOTES
26.  Mika Mireles is a 32 y.o.   at 38w0d patient of Dr Darwin Talbot at [facility] who presents to L&D for scheduled IOL for Gestational DM. She reports positive FM, denies LOF, VB or contractions. Urine sample obtained. EFM and toco placed for initial assessment. . Dr. Darwin Talbot in room to discuss plan of care.   . Bedside shift change report given to ROGELIO Forrest, RN, CARISSA Mendoza RN (oncoming nurse) by LUANNE Rascon RN, BAM Posadas RN (offgoing nurse). Report included the following information SBAR, Kardex, Intake/Output, MAR and Recent Results.

## 2022-05-18 NOTE — PROGRESS NOTES
1910: Bedside and Verbal shift change report given to CARISSA Mendoza, RN and Fer Sanders, RN (oncoming nurse) by LUANNE Muro, RN (offgoing nurse). Report included the following information SBAR and Kardex. [id number]: EFM not tracing consistently due to pt position. RN at bedside adjusting     [id number]: EFM not tracing consistently due to pt position. RN at bedside adjusting     [id number]: EFM not tracing consistently due to pt position. RN at bedside adjusting     [id number]:  EFM not tracing consistently due to pt position.  RN at bedside adjusting

## 2022-05-19 ENCOUNTER — ANESTHESIA (OUTPATIENT)
Dept: LABOR AND DELIVERY | Age: 32
End: 2022-05-19
Payer: COMMERCIAL

## 2022-05-19 ENCOUNTER — ANESTHESIA EVENT (OUTPATIENT)
Dept: LABOR AND DELIVERY | Age: 32
End: 2022-05-19
Payer: COMMERCIAL

## 2022-05-19 LAB
GLUCOSE BLD STRIP.AUTO-MCNC: 90 MG/DL (ref 65–117)
GLUCOSE BLD STRIP.AUTO-MCNC: 90 MG/DL (ref 65–117)
GLUCOSE BLD STRIP.AUTO-MCNC: 92 MG/DL (ref 65–117)
GLUCOSE BLD STRIP.AUTO-MCNC: 97 MG/DL (ref 65–117)
SERVICE CMNT-IMP: NORMAL

## 2022-05-19 PROCEDURE — 82962 GLUCOSE BLOOD TEST: CPT

## 2022-05-19 PROCEDURE — 75410000003 HC RECOV DEL/VAG/CSECN EA 0.5 HR

## 2022-05-19 PROCEDURE — 74011250636 HC RX REV CODE- 250/636: Performed by: STUDENT IN AN ORGANIZED HEALTH CARE EDUCATION/TRAINING PROGRAM

## 2022-05-19 PROCEDURE — 74011000258 HC RX REV CODE- 258: Performed by: OBSTETRICS & GYNECOLOGY

## 2022-05-19 PROCEDURE — 74011250636 HC RX REV CODE- 250/636: Performed by: OBSTETRICS & GYNECOLOGY

## 2022-05-19 PROCEDURE — 74011250636 HC RX REV CODE- 250/636: Performed by: ANESTHESIOLOGY

## 2022-05-19 PROCEDURE — 76060000078 HC EPIDURAL ANESTHESIA

## 2022-05-19 PROCEDURE — 74011000250 HC RX REV CODE- 250

## 2022-05-19 PROCEDURE — 77010026065 HC OXYGEN MINIMUM MEDICAL AIR

## 2022-05-19 PROCEDURE — 74011000250 HC RX REV CODE- 250: Performed by: ANESTHESIOLOGY

## 2022-05-19 PROCEDURE — 3E033VJ INTRODUCTION OF OTHER HORMONE INTO PERIPHERAL VEIN, PERCUTANEOUS APPROACH: ICD-10-PCS | Performed by: STUDENT IN AN ORGANIZED HEALTH CARE EDUCATION/TRAINING PROGRAM

## 2022-05-19 PROCEDURE — 75410000002 HC LABOR FEE PER 1 HR

## 2022-05-19 PROCEDURE — 74011000258 HC RX REV CODE- 258: Performed by: STUDENT IN AN ORGANIZED HEALTH CARE EDUCATION/TRAINING PROGRAM

## 2022-05-19 PROCEDURE — 77030014125 HC TY EPDRL BBMI -B: Performed by: ANESTHESIOLOGY

## 2022-05-19 PROCEDURE — 74011250637 HC RX REV CODE- 250/637: Performed by: OBSTETRICS & GYNECOLOGY

## 2022-05-19 PROCEDURE — 65410000002 HC RM PRIVATE OB

## 2022-05-19 PROCEDURE — 74011250636 HC RX REV CODE- 250/636: Performed by: NURSE ANESTHETIST, CERTIFIED REGISTERED

## 2022-05-19 RX ORDER — SODIUM CHLORIDE 0.9 % (FLUSH) 0.9 %
5-40 SYRINGE (ML) INJECTION EVERY 8 HOURS
Status: DISCONTINUED | OUTPATIENT
Start: 2022-05-19 | End: 2022-05-19 | Stop reason: HOSPADM

## 2022-05-19 RX ORDER — FENTANYL CITRATE 50 UG/ML
INJECTION, SOLUTION INTRAMUSCULAR; INTRAVENOUS AS NEEDED
Status: DISCONTINUED | OUTPATIENT
Start: 2022-05-19 | End: 2022-05-19 | Stop reason: HOSPADM

## 2022-05-19 RX ORDER — OXYTOCIN/RINGER'S LACTATE 30/500 ML
0-20 PLASTIC BAG, INJECTION (ML) INTRAVENOUS
Status: DISCONTINUED | OUTPATIENT
Start: 2022-05-19 | End: 2022-05-22 | Stop reason: HOSPADM

## 2022-05-19 RX ORDER — ONDANSETRON 2 MG/ML
INJECTION INTRAMUSCULAR; INTRAVENOUS AS NEEDED
Status: DISCONTINUED | OUTPATIENT
Start: 2022-05-19 | End: 2022-05-19 | Stop reason: HOSPADM

## 2022-05-19 RX ORDER — DIPHENHYDRAMINE HCL 25 MG
25 CAPSULE ORAL
Status: DISCONTINUED | OUTPATIENT
Start: 2022-05-19 | End: 2022-05-22 | Stop reason: HOSPADM

## 2022-05-19 RX ORDER — NALOXONE HYDROCHLORIDE 0.4 MG/ML
0.4 INJECTION, SOLUTION INTRAMUSCULAR; INTRAVENOUS; SUBCUTANEOUS AS NEEDED
Status: DISCONTINUED | OUTPATIENT
Start: 2022-05-19 | End: 2022-05-22 | Stop reason: HOSPADM

## 2022-05-19 RX ORDER — OXYTOCIN/RINGER'S LACTATE 30/500 ML
10 PLASTIC BAG, INJECTION (ML) INTRAVENOUS AS NEEDED
Status: DISCONTINUED | OUTPATIENT
Start: 2022-05-19 | End: 2022-05-22 | Stop reason: HOSPADM

## 2022-05-19 RX ORDER — IBUPROFEN 600 MG/1
600 TABLET ORAL
Status: DISCONTINUED | OUTPATIENT
Start: 2022-05-19 | End: 2022-05-22 | Stop reason: HOSPADM

## 2022-05-19 RX ORDER — BUPIVACAINE HYDROCHLORIDE AND EPINEPHRINE 2.5; 5 MG/ML; UG/ML
INJECTION, SOLUTION EPIDURAL; INFILTRATION; INTRACAUDAL; PERINEURAL
Status: COMPLETED
Start: 2022-05-19 | End: 2022-05-19

## 2022-05-19 RX ORDER — SODIUM CHLORIDE, SODIUM LACTATE, POTASSIUM CHLORIDE, CALCIUM CHLORIDE 600; 310; 30; 20 MG/100ML; MG/100ML; MG/100ML; MG/100ML
125 INJECTION, SOLUTION INTRAVENOUS CONTINUOUS
Status: DISCONTINUED | OUTPATIENT
Start: 2022-05-20 | End: 2022-05-22 | Stop reason: HOSPADM

## 2022-05-19 RX ORDER — FENTANYL/BUPIVACAINE/NS/PF 2-1250MCG
1-16 PREFILLED PUMP RESERVOIR EPIDURAL CONTINUOUS
Status: DISCONTINUED | OUTPATIENT
Start: 2022-05-19 | End: 2022-05-19 | Stop reason: HOSPADM

## 2022-05-19 RX ORDER — SIMETHICONE 80 MG
80 TABLET,CHEWABLE ORAL AS NEEDED
Status: DISCONTINUED | OUTPATIENT
Start: 2022-05-19 | End: 2022-05-22 | Stop reason: HOSPADM

## 2022-05-19 RX ORDER — ACETAMINOPHEN 325 MG/1
650 TABLET ORAL
Status: DISCONTINUED | OUTPATIENT
Start: 2022-05-19 | End: 2022-05-22 | Stop reason: HOSPADM

## 2022-05-19 RX ORDER — HYDROMORPHONE HYDROCHLORIDE 2 MG/1
2 TABLET ORAL
Status: DISCONTINUED | OUTPATIENT
Start: 2022-05-19 | End: 2022-05-21

## 2022-05-19 RX ORDER — ZOLPIDEM TARTRATE 5 MG/1
5 TABLET ORAL
Status: DISCONTINUED | OUTPATIENT
Start: 2022-05-19 | End: 2022-05-22 | Stop reason: HOSPADM

## 2022-05-19 RX ORDER — LIDOCAINE HYDROCHLORIDE AND EPINEPHRINE 20; 5 MG/ML; UG/ML
INJECTION, SOLUTION EPIDURAL; INFILTRATION; INTRACAUDAL; PERINEURAL AS NEEDED
Status: DISCONTINUED | OUTPATIENT
Start: 2022-05-19 | End: 2022-05-19 | Stop reason: HOSPADM

## 2022-05-19 RX ORDER — OXYTOCIN/RINGER'S LACTATE 30/500 ML
PLASTIC BAG, INJECTION (ML) INTRAVENOUS
Status: DISCONTINUED | OUTPATIENT
Start: 2022-05-19 | End: 2022-05-19 | Stop reason: HOSPADM

## 2022-05-19 RX ORDER — OXYTOCIN/RINGER'S LACTATE 30/500 ML
0-20 PLASTIC BAG, INJECTION (ML) INTRAVENOUS
Status: DISCONTINUED | OUTPATIENT
Start: 2022-05-19 | End: 2022-05-19

## 2022-05-19 RX ORDER — FENTANYL/BUPIVACAINE/NS/PF 2-1250MCG
PREFILLED PUMP RESERVOIR EPIDURAL
Status: COMPLETED
Start: 2022-05-19 | End: 2022-05-19

## 2022-05-19 RX ORDER — DIPHENHYDRAMINE HYDROCHLORIDE 50 MG/ML
25-50 INJECTION, SOLUTION INTRAMUSCULAR; INTRAVENOUS
Status: CANCELLED | OUTPATIENT
Start: 2022-05-19

## 2022-05-19 RX ORDER — SODIUM CHLORIDE 0.9 % (FLUSH) 0.9 %
5-40 SYRINGE (ML) INJECTION EVERY 8 HOURS
Status: DISCONTINUED | OUTPATIENT
Start: 2022-05-20 | End: 2022-05-22 | Stop reason: HOSPADM

## 2022-05-19 RX ORDER — PHENYLEPHRINE HCL IN 0.9% NACL 0.4MG/10ML
SYRINGE (ML) INTRAVENOUS AS NEEDED
Status: DISCONTINUED | OUTPATIENT
Start: 2022-05-19 | End: 2022-05-19 | Stop reason: HOSPADM

## 2022-05-19 RX ORDER — MORPHINE SULFATE 0.5 MG/ML
INJECTION, SOLUTION EPIDURAL; INTRATHECAL; INTRAVENOUS AS NEEDED
Status: DISCONTINUED | OUTPATIENT
Start: 2022-05-19 | End: 2022-05-19 | Stop reason: HOSPADM

## 2022-05-19 RX ORDER — SODIUM CHLORIDE 0.9 % (FLUSH) 0.9 %
5-40 SYRINGE (ML) INJECTION AS NEEDED
Status: DISCONTINUED | OUTPATIENT
Start: 2022-05-19 | End: 2022-05-22 | Stop reason: HOSPADM

## 2022-05-19 RX ORDER — BUPIVACAINE HYDROCHLORIDE AND EPINEPHRINE 2.5; 5 MG/ML; UG/ML
INJECTION, SOLUTION EPIDURAL; INFILTRATION; INTRACAUDAL; PERINEURAL AS NEEDED
Status: DISCONTINUED | OUTPATIENT
Start: 2022-05-19 | End: 2022-05-19 | Stop reason: HOSPADM

## 2022-05-19 RX ORDER — IBUPROFEN 400 MG/1
800 TABLET ORAL EVERY 8 HOURS
Status: DISCONTINUED | OUTPATIENT
Start: 2022-05-20 | End: 2022-05-22 | Stop reason: HOSPADM

## 2022-05-19 RX ORDER — HYDROMORPHONE HYDROCHLORIDE 2 MG/ML
1 INJECTION, SOLUTION INTRAMUSCULAR; INTRAVENOUS; SUBCUTANEOUS
Status: DISCONTINUED | OUTPATIENT
Start: 2022-05-19 | End: 2022-05-22 | Stop reason: HOSPADM

## 2022-05-19 RX ORDER — DOCUSATE SODIUM 100 MG/1
100 CAPSULE, LIQUID FILLED ORAL 2 TIMES DAILY
Status: DISCONTINUED | OUTPATIENT
Start: 2022-05-20 | End: 2022-05-22 | Stop reason: HOSPADM

## 2022-05-19 RX ORDER — ONDANSETRON 2 MG/ML
4 INJECTION INTRAMUSCULAR; INTRAVENOUS
Status: DISCONTINUED | OUTPATIENT
Start: 2022-05-19 | End: 2022-05-22 | Stop reason: HOSPADM

## 2022-05-19 RX ORDER — OXYTOCIN/RINGER'S LACTATE 30/500 ML
87.3 PLASTIC BAG, INJECTION (ML) INTRAVENOUS AS NEEDED
Status: DISCONTINUED | OUTPATIENT
Start: 2022-05-19 | End: 2022-05-22 | Stop reason: HOSPADM

## 2022-05-19 RX ORDER — FENTANYL CITRATE 50 UG/ML
INJECTION, SOLUTION INTRAMUSCULAR; INTRAVENOUS
Status: COMPLETED
Start: 2022-05-19 | End: 2022-05-19

## 2022-05-19 RX ORDER — SODIUM CHLORIDE 0.9 % (FLUSH) 0.9 %
5-40 SYRINGE (ML) INJECTION AS NEEDED
Status: DISCONTINUED | OUTPATIENT
Start: 2022-05-19 | End: 2022-05-19 | Stop reason: HOSPADM

## 2022-05-19 RX ORDER — LIDOCAINE HYDROCHLORIDE AND EPINEPHRINE 20; 5 MG/ML; UG/ML
INJECTION, SOLUTION EPIDURAL; INFILTRATION; INTRACAUDAL; PERINEURAL
Status: COMPLETED
Start: 2022-05-19 | End: 2022-05-19

## 2022-05-19 RX ORDER — NALOXONE HYDROCHLORIDE 0.4 MG/ML
0.4 INJECTION, SOLUTION INTRAMUSCULAR; INTRAVENOUS; SUBCUTANEOUS AS NEEDED
Status: CANCELLED | OUTPATIENT
Start: 2022-05-19

## 2022-05-19 RX ORDER — SODIUM CHLORIDE, SODIUM LACTATE, POTASSIUM CHLORIDE, CALCIUM CHLORIDE 600; 310; 30; 20 MG/100ML; MG/100ML; MG/100ML; MG/100ML
INJECTION, SOLUTION INTRAVENOUS
Status: DISCONTINUED | OUTPATIENT
Start: 2022-05-19 | End: 2022-05-19 | Stop reason: HOSPADM

## 2022-05-19 RX ADMIN — MORPHINE SULFATE 5 MG: 0.5 INJECTION, SOLUTION EPIDURAL; INTRATHECAL; INTRAVENOUS at 15:01

## 2022-05-19 RX ADMIN — BUPIVACAINE HYDROCHLORIDE AND EPINEPHRINE 0.5 ML: 2.5; 5 INJECTION, SOLUTION EPIDURAL; INFILTRATION; INTRACAUDAL; PERINEURAL at 11:08

## 2022-05-19 RX ADMIN — BUPIVACAINE HYDROCHLORIDE AND EPINEPHRINE 3 ML: 2.5; 5 INJECTION, SOLUTION EPIDURAL; INFILTRATION; INTRACAUDAL; PERINEURAL at 11:10

## 2022-05-19 RX ADMIN — BUTORPHANOL TARTRATE 2 MG: 2 INJECTION, SOLUTION INTRAMUSCULAR; INTRAVENOUS at 08:30

## 2022-05-19 RX ADMIN — IBUPROFEN 600 MG: 600 TABLET ORAL at 22:03

## 2022-05-19 RX ADMIN — SODIUM CHLORIDE 2.5 MILLION UNITS: 9 INJECTION, SOLUTION INTRAVENOUS at 04:47

## 2022-05-19 RX ADMIN — Medication 80 MCG: at 14:58

## 2022-05-19 RX ADMIN — Medication 1 MILLI-UNITS/MIN: at 08:39

## 2022-05-19 RX ADMIN — Medication 80 MCG: at 14:39

## 2022-05-19 RX ADMIN — LIDOCAINE HYDROCHLORIDE,EPINEPHRINE BITARTRATE 5 ML: 20; .005 INJECTION, SOLUTION EPIDURAL; INFILTRATION; INTRACAUDAL; PERINEURAL at 13:55

## 2022-05-19 RX ADMIN — SODIUM CHLORIDE, POTASSIUM CHLORIDE, SODIUM LACTATE AND CALCIUM CHLORIDE: 600; 310; 30; 20 INJECTION, SOLUTION INTRAVENOUS at 14:03

## 2022-05-19 RX ADMIN — Medication 12 ML/HR: at 11:21

## 2022-05-19 RX ADMIN — Medication 80 MCG: at 14:51

## 2022-05-19 RX ADMIN — BUPIVACAINE HYDROCHLORIDE AND EPINEPHRINE 3 ML: 2.5; 5 INJECTION, SOLUTION EPIDURAL; INFILTRATION; INTRACAUDAL; PERINEURAL at 11:09

## 2022-05-19 RX ADMIN — Medication 80 MCG: at 14:20

## 2022-05-19 RX ADMIN — CEFAZOLIN SODIUM 3 G: 10 INJECTION, POWDER, FOR SOLUTION INTRAVENOUS at 14:06

## 2022-05-19 RX ADMIN — Medication 500 ML/HR: at 14:31

## 2022-05-19 RX ADMIN — SODIUM CHLORIDE 5 MILLION UNITS: 900 INJECTION INTRAVENOUS at 00:43

## 2022-05-19 RX ADMIN — SODIUM CHLORIDE 2.5 MILLION UNITS: 9 INJECTION, SOLUTION INTRAVENOUS at 08:14

## 2022-05-19 RX ADMIN — Medication 40 MCG: at 14:25

## 2022-05-19 RX ADMIN — SODIUM CHLORIDE, POTASSIUM CHLORIDE, SODIUM LACTATE AND CALCIUM CHLORIDE 999 ML/HR: 600; 310; 30; 20 INJECTION, SOLUTION INTRAVENOUS at 10:55

## 2022-05-19 RX ADMIN — Medication 80 MCG: at 14:54

## 2022-05-19 RX ADMIN — AZITHROMYCIN MONOHYDRATE 500 MG: 500 INJECTION, POWDER, LYOPHILIZED, FOR SOLUTION INTRAVENOUS at 14:20

## 2022-05-19 RX ADMIN — LIDOCAINE HYDROCHLORIDE,EPINEPHRINE BITARTRATE 5 ML: 20; .005 INJECTION, SOLUTION EPIDURAL; INFILTRATION; INTRACAUDAL; PERINEURAL at 13:57

## 2022-05-19 RX ADMIN — OXYTOCIN 9 MILLI-UNITS/MIN: 10 INJECTION INTRAVENOUS at 13:33

## 2022-05-19 RX ADMIN — Medication 120 MCG: at 14:11

## 2022-05-19 RX ADMIN — LIDOCAINE HYDROCHLORIDE,EPINEPHRINE BITARTRATE 5 ML: 20; .005 INJECTION, SOLUTION EPIDURAL; INFILTRATION; INTRACAUDAL; PERINEURAL at 13:54

## 2022-05-19 RX ADMIN — Medication 25 MCG: at 02:09

## 2022-05-19 RX ADMIN — ONDANSETRON HYDROCHLORIDE 4 MG: 2 INJECTION, SOLUTION INTRAMUSCULAR; INTRAVENOUS at 14:17

## 2022-05-19 RX ADMIN — FENTANYL CITRATE 100 MCG: 50 INJECTION, SOLUTION INTRAMUSCULAR; INTRAVENOUS at 11:10

## 2022-05-19 RX ADMIN — SODIUM CHLORIDE 2.5 MILLION UNITS: 9 INJECTION, SOLUTION INTRAVENOUS at 12:51

## 2022-05-19 RX ADMIN — Medication 80 MCG: at 14:14

## 2022-05-19 RX ADMIN — Medication 80 MCG: at 15:07

## 2022-05-19 RX ADMIN — Medication 80 MCG: at 14:33

## 2022-05-19 RX ADMIN — Medication 80 MCG: at 15:04

## 2022-05-19 RX ADMIN — Medication 80 MCG: at 15:01

## 2022-05-19 RX ADMIN — LIDOCAINE HYDROCHLORIDE,EPINEPHRINE BITARTRATE 5 ML: 20; .005 INJECTION, SOLUTION EPIDURAL; INFILTRATION; INTRACAUDAL; PERINEURAL at 13:56

## 2022-05-19 NOTE — PROGRESS NOTES
S:  Uncomfortable with ctx's    O:  125/60, 81, 98.6, 97%RA  Gen:  Morbid obese WF, uncomfortable appearing with ctx's, A&O  FHT:  Cat I  [place]:  Ctx's q 3-4 min s/p buccal cytotec x 3 (last at 020)  SVE:  3/50/-3 (ballotable)    A:  31 yo G1 at 38+1 undergoing IOL due to poorly controlled/non-compliant GDMA2 and oligohydramnios. PNC also c/b morbid obesity. Now s/p cervical ripening. Reassuring fetal status. GBS pos. P:  1. Allow pt to shower  2. Pitocin per protocol  3. AROM when indicated/able  4. Consider internal monitors due to body habitus  5. Continue PCN GBS proph  6.  Epidural prn  7. SCD's after epidural placement due to Morbid Obesity  8. Anticipate vaginal delivery.  for standard fetal/maternal indications.

## 2022-05-19 NOTE — ANESTHESIA PREPROCEDURE EVALUATION
Relevant Problems   ENDOCRINE   (+) Gestational diabetes   (+) Obesity, morbid (HCC)       Anesthetic History   No history of anesthetic complications            Review of Systems / Medical History  Patient summary reviewed, nursing notes reviewed and pertinent labs reviewed    Pulmonary                Comments: Former Smoker - Quit 2021   Neuro/Psych   Within defined limits           Cardiovascular                  Exercise tolerance: <4 METS     GI/Hepatic/Renal  Within defined limits              Endo/Other    Diabetes (Gestational diabetes): type 2    Morbid obesity    Comments: PCOS Other Findings   Comments: Super Morbid Obesity         Physical Exam    Airway  Mallampati: III    Neck ROM: normal range of motion   Mouth opening: Normal     Cardiovascular  Regular rate and rhythm,  S1 and S2 normal,  no murmur, click, rub, or gallop             Dental         Pulmonary      Decreased breath sounds: bibasilar           Abdominal  GI exam deferred       Other Findings            Anesthetic Plan    ASA: 3  Anesthesia type: CSE            Anesthetic plan and risks discussed with: Patient

## 2022-05-19 NOTE — PROGRESS NOTES
Labor Progress Note  Patient seen, fetal heart rate and contraction pattern evaluated, patient examined.   Visit Vitals  /81   Pulse 77   Temp 98.4 °F (36.9 °C)   Resp 16   Ht 5' 3\" (1.6 m)   Wt 133.8 kg (295 lb)   SpO2 97%   BMI 52.26 kg/m²       Physical Exam:  Cervical Exam:  3/50/-3  Membranes: AROM w clear fluid  Uterine Activity: Irregular pattern q1-2min followed by spacing 4-5 min  Fetal Heart Rate: Reactive  Baseline: 130 per minute  Variability: moderate  Accelerations: yes  Decelerations: 2 late decelerations following placement of epidural - resolved with repositioning    Assessment/Plan:  33 yo G1 @ 38w1d, IOL for poorly controlled/ non-compliant GDMA2, morbid obesity, borderline fluid  - S/p cytotec x3, now on pitocin  - IUPC and FSE placed given habitus for ease of monitoring  - AROM w clear fluid  - Continue GBS for ppx  Katey Pope MD

## 2022-05-19 NOTE — PROGRESS NOTES
Labor Progress Note  Patient seen, fetal heart rate and contraction pattern evaluated, patient examined.   Visit Vitals  BP (!) 109/58   Pulse 77   Temp 98.2 °F (36.8 °C)   Resp 18   Ht 5' 3\" (1.6 m)   Wt 133.8 kg (295 lb)   SpO2 98%   BMI 52.26 kg/m²       Physical Exam:  Cervical Exam:  3/50/-3  Membranes: S/p AROM w clear fluid  Uterine Activity: Irregular pattern q1-2min followed by spacing 4-5 min  Fetal Heart Rate: Reactive  Baseline: 140 per minute  Variability: moderate  Accelerations: yes  Decelerations: Late decelerations noted,    Assessment/Plan:  33 yo G1 @ 38w1d, IOL for poorly controlled/ non-compliant GDMA2, morbid obesity, borderline fluid  - S/p cytotec x3, now on pitocin  - IUPC and FSE in place given habitus for ease of monitoring  - S/p AROM w clear fluid  - Continue GBS for ppx  - Will continue to reposition to resolve decelerations; IV fluid bolus  Abram Dickerson MD

## 2022-05-19 NOTE — PROGRESS NOTES
Nutrition: MST referral received for gestational DM. Patient admitted for induction of labor. Nutrition assessment not indicated. RD available by consult if needs arise. Thanks.   Mark Anthony Pena RD

## 2022-05-19 NOTE — PROGRESS NOTES
Problem: Vaginal Delivery: Day of Deliver-Laboring  Goal: Activity/Safety  Outcome: Progressing Towards Goal  Goal: Nutrition/Diet  Outcome: Progressing Towards Goal  Goal: Medications  Outcome: Progressing Towards Goal  Goal: Respiratory  Outcome: Progressing Towards Goal  Goal: *Vital signs within defined limits  Outcome: Progressing Towards Goal  Goal: *Labs within defined limits  Outcome: Progressing Towards Goal

## 2022-05-19 NOTE — PROGRESS NOTES
0530 Dr. Jordan updated on patient condition. 2862 Dr. Jordan at bedside for evaluation and plan of care. 0551 SVE at this time by Dr. Jordan: 3/50/-3. Plan to allow patient to shower and then start pitocin. This RN agrees with all charting and assessments completed by CARISSA Mendoza, HAVEN.    2511 Report given to SHAHRZAD Astudillo, HAVEN to assume care of patient at this time.

## 2022-05-19 NOTE — ROUTINE PROCESS
Bedside and Verbal shift change report given to Spencer Hernandez RN Report included the following information: SBAR, Kardex, Intake/Output, MAR, Recent Results and Med Rec Status. Opportunity for questions and clarification was provided.

## 2022-05-19 NOTE — ANESTHESIA POSTPROCEDURE EVALUATION
Procedure(s):   SECTION. CSE    Anesthesia Post Evaluation        Patient location during evaluation: PACU  Note status: Adequate. Level of consciousness: responsive to verbal stimuli and sleepy but conscious  Pain management: satisfactory to patient  Airway patency: patent  Anesthetic complications: no  Cardiovascular status: acceptable  Respiratory status: acceptable  Hydration status: acceptable  Comments: +Post-Anesthesia Evaluation and Assessment    Patient: Eliezer Barkley MRN: [id number]  SSN: [id number]   YOB: 1990  Age: 32 y.o. Sex: female      Cardiovascular Function/Vital Signs    BP 97/64   Pulse 89   Temp 36.7 °C (98.1 °F)   Resp 22   Ht 5' 3\" (1.6 m)   Wt 133.8 kg (295 lb)   SpO2 99%   BMI 52.26 kg/m²     Patient is status post Procedure(s):   SECTION. Nausea/Vomiting: Controlled. Postoperative hydration reviewed and adequate. Pain:  Pain Scale 1: Numeric (0 - 10) (22 1134)  Pain Intensity 1: 0 (22 1134)   Managed. Neurological Status:   Neuro (WDL): Within Defined Limits (22 1900)   At baseline. Mental Status and Level of Consciousness: Arousable. Pulmonary Status:   O2 Device: Nasal cannula (22 1543)   Adequate oxygenation and airway patent. Complications related to anesthesia: None    Post-anesthesia assessment completed. No concerns. Signed By: Charlee Marie MD    2022  Post anesthesia nausea and vomiting:  controlled      INITIAL Post-op Vital signs: No vitals data found for the desired time range.

## 2022-05-19 NOTE — ANESTHESIA PROCEDURE NOTES
CSE Block    Start time: 5/19/2022 11:02 AM  End time: 5/19/2022 11:12 AM  Performed by: Kimberly Leone MD  Authorized by: Kimberly Leone MD     Pre-Procedure  Indications: at surgeon's request and procedure for pain    preanesthetic checklist: patient identified, risks and benefits discussed, anesthesia consent, site marked, patient being monitored and timeout performed    Timeout Time: 11:02 EDT        Procedure:   Patient Position:  Seated  Prep Region:  Lumbar  Prep: DuraPrep    Location:  L2-3    Epidural Needle:   Needle Type:  Tuohy  Needle Gauge:  17 G  Injection Technique:  Loss of resistance using saline  Attempts:  1    Spinal Needle:   Needle Type:   Marley  Needle Gauge:  25 G    Catheter:   Catheter Type:  Flex-tip  Catheter Size:  19 G  Catheter at Skin Depth (cm):  12  Depth in Epidural Space (cm):  4  Events: no blood with aspiration, no cerebrospinal fluid with aspiration, no paresthesia and negative aspiration test    Test Dose:  Negative    Assessment:   Catheter Secured:  Tegaderm and tape  Insertion:  Uncomplicated  Patient tolerance:  Patient tolerated the procedure well with no immediate complications

## 2022-05-19 NOTE — PROGRESS NOTES
1801: Patient arrived yesterday for ripening with cytotec. She received 3 doses. After Starting her pitocin, we saw fetal distress (see strip.) there were late decelerations noted as well as repeated varibales. We attempted turning the patient and trying different positions, none of which worked. Ultimately Dr. Mike Jasso called a c section at [phone]. Patient arrived in the OR at 46. All counts were performed correctly. Surgery was uneventful. 700 mL EBL. The surgery ended at 1510. Patient arrived in her room at 1540. Her recovery period lasted from 2450-7738. Small bleeding, fundus was 2 below umbilicus. Patient is currently tolerating water and ice. RN will give report to [facility] nurse [place].

## 2022-05-19 NOTE — PROGRESS NOTES
Labor Progress Note  Patient seen, fetal heart rate and contraction pattern evaluated, patient examined. Visit Vitals  BP (!) 109/58   Pulse 77   Temp 98.2 °F (36.8 °C)   Resp 18   Ht 5' 3\" (1.6 m)   Wt 133.8 kg (295 lb)   SpO2 98%   BMI 52.26 kg/m²       Physical Exam:  Cervical Exam:  3/50/-3  Membranes: S/p AROM w clear fluid  Uterine Activity: Irregular pattern q1-2min followed by spacing 4-5 min  Fetal Heart Rate: Reactive  Baseline: 140 per minute  Variability: moderate  Accelerations: yes  Decelerations: Recurrent late decelerations    Assessment/Plan:  33 yo G1 @ 38w1d, IOL for poorly controlled/ non-compliant GDMA2, morbid obesity, borderline fluid  Recurrent late decelerations despite repositioning and no cervical change despite pitocin titration. Given NRFS remote from delivery discussed proceeding with CS. Discussed the risks of surgery including the risks of bleeding, infection, deep vein thrombosis, and surgical injuries to internal organs including but not limited to the bowels, bladder, rectum, and female reproductive organs. The patient understands the risks; any and all questions were answered to the patient's satisfaction.   Kelly Riggins MD

## 2022-05-19 NOTE — OP NOTES
Operative Note    Name: Milena Carvajal   Medical Record Number: [id number]   YOB: 1990  Today's Date: May 19, 2022      Pre-operative Diagnosis: Fetal Intolerance of Labor    Post-operative Diagnosis: same    Operation: Low Transverse   SECTION    Surgeon(s):  Katia Smalls MD     Assistant: Loree Jackson MD    Anesthesia: CSE    Prophylactic Antibiotics: Ancef  DVT Prophylaxis: Sequential Compression Devices       Fetal Description: damon     Birth Information:   Information for the patient's :  Sandeep Woo [[id number]]   Delivery of a 3.355 kg male infant on 2022 at 2:29 PM. Apgars were 9  and 9 . Umbilical Cord: Nuchal Cord x  2  3 vessel cord    Placenta:  expressed    Specimens: None           Complications:  none    Procedure Detail:      After proper patient identification and consent, the patient was taken to the operating room, where epidural anesthesia was dosed and found to be adequate. Valero catheter had been placed using sterile technique. The patient was prepped and draped in the normal sterile fashion. The abdomen was entered using the Pfannenstiel technique. The peritoneum was entered sharply well superior to the bladder without any apparent injury. A low transverse uterine incision was made with the scalpel and extended with blunt finger dissection. The babys head was then delivered atraumatically with assistance of the Kiwi vacuum device given maternal habitus. The nose and mouth were suctioned. The cord was clamped and cut and the baby was handed off to Nursing staff in attendance. Placenta was then removed from the uterus. The uterus was curettaged with a moist lap pad and cleared of all clots and debris. The uterine incision was closed with 0 vicryl, double layer  in running locking fashion with good hemostasis assured. The posterior cul-de-sac and paracolic gutters were irrigated with warm normal saline.  Good hemostasis was again reassured. The anterior pelvis was irrigated with warm normal saline and good hemostasis was again reassured throughout. The fascia was closed with 0 Vicryl in a running fashion from either apex. Good hemostasis was assured. The subcutaneous space was irrigated copiously and made hemostatic with Bovie cautery. This space was closed in 2 layers using 3-0 plain gut. The skin was closed with a subcuticular staple closure and covered with a JOAQUIN dressing. The patient tolerated the procedure well. Sponge, lap, and needle counts were correct times three and the patient and baby were taken to recovery/postpartum room in stable condition.     Laya Mueller MD  May 19, 2022  3:25 PM

## 2022-05-19 NOTE — PROGRESS NOTES
1030: Pt. Care assumed at this time. 1049: IVF bolus complete. Pt. Requesting Epidural. Anesthesia notified, Dr. Belen Brannon. 1055: Pt. Sitting up at bedside for epidural placement. EFM and TOCO intermittently tracing due to maternal positioning. 1113: Pt. Back to bed. Epidural placement complete. EFM and TOCO readjusted. 1134: Dr. Karen Harris at bedside. Strip reviewed. SVE performed with AROM, 3cm. FSE and IUPC placed by Dr. Karen Harris. 1145: SBAR update given to SHAHRZAD Jarquin RN. Pt. Care turned over at this time.

## 2022-05-19 NOTE — PROGRESS NOTES
Labor Progress Note  Patient seen, fetal heart rate and contraction pattern evaluated, patient examined.   Visit Vitals  /81   Pulse 77   Temp 98.4 °F (36.9 °C)   Resp 16   Ht 5' 3\" (1.6 m)   Wt 133.8 kg (295 lb)   SpO2 97%   BMI 52.26 kg/m²       Physical Exam:  Cervical Exam:  3/50/-3  Membranes:  Intact  Uterine Activity: q5min  Fetal Heart Rate: Reactive  Baseline: 130 per minute  Variability: moderate  Accelerations: yes  Decelerations: none    Assessment/Plan:  33 yo G1 @ 38w1d, IOL for poorly controlled/ non-compliant GDMA2, morbid obesity, borderline fluid  - S/p cytotec x3  - Unable to AROM ; still ballotable  - Pitocin and reassess  - Continue GBS for ppx  Hillary Hurt MD

## 2022-05-19 NOTE — L&D DELIVERY NOTE
Delivery Summary  Patient: Des Graham             Circumcision:   desires  Additional Delivery Comments -   Patient admitted for IOL for poorly controlled [place] in setting of non-compliance with morbid obesity and borderline CONOR of 5. IOL was started with cytotec and patient made progress to 3cm. Pitocin was started after and AROM was performed with clear fluid. Patient did not progress past 3cm and pitocin was unable to be increased given fetal intolerance despite repositioning and appropriate interventions. Given NRFS remote from delivery decision made to proceed with CS. See Op note for details.     Information for the patient's :  Gabino Pierce [[id number]]     Delivery Type:     Delivery Date: 2022   Delivery Time: 2:29 PM     Birth Weight: 3.355 kg     Sex:  male  Delivery Clinician:      Gestational Age: 43w4d    Presentation:     Position:             Apgars were 9  and 9      Resuscitation Method: Tactile Stimulation     Meconium Stained: None    Living Status: Living       Placenta Date/Time:     Placenta Removal:     Placenta Appearance:      Cord Information: 3 Vessels    Cord Events:         Disposition of Cord Blood:      Blood Gases Sent?:          Cord pH:  none    Episiotomy:    Laceration(s):      Estimated Blood Loss (ml): 700cc    Labor Events  Method:       Augmentation:    Cervical Ripenin2022  5:30 PM  Misoprostol      Nora Thornton MD Sent perfect serve to radiologist Dr Orozco

## 2022-05-20 LAB
BASOPHILS # BLD: 0 K/UL (ref 0–0.1)
BASOPHILS NFR BLD: 0 % (ref 0–1)
DIFFERENTIAL METHOD BLD: ABNORMAL
EOSINOPHIL # BLD: 0.1 K/UL (ref 0–0.4)
EOSINOPHIL NFR BLD: 1 % (ref 0–7)
ERYTHROCYTE [DISTWIDTH] IN BLOOD BY AUTOMATED COUNT: 14.2 % (ref 11.5–14.5)
GLUCOSE BLD STRIP.AUTO-MCNC: 92 MG/DL (ref 65–117)
HCT VFR BLD AUTO: 32.6 % (ref 35–47)
HGB BLD-MCNC: 10.4 G/DL (ref 11.5–16)
IMM GRANULOCYTES # BLD AUTO: 0 K/UL (ref 0–0.04)
IMM GRANULOCYTES NFR BLD AUTO: 0 % (ref 0–0.5)
LYMPHOCYTES # BLD: 1.8 K/UL (ref 0.8–3.5)
LYMPHOCYTES NFR BLD: 23 % (ref 12–49)
MCH RBC QN AUTO: 28 PG (ref 26–34)
MCHC RBC AUTO-ENTMCNC: 31.9 G/DL (ref 30–36.5)
MCV RBC AUTO: 87.9 FL (ref 80–99)
MONOCYTES # BLD: 0.5 K/UL (ref 0–1)
MONOCYTES NFR BLD: 6 % (ref 5–13)
NEUTS SEG # BLD: 5.4 K/UL (ref 1.8–8)
NEUTS SEG NFR BLD: 70 % (ref 32–75)
NRBC # BLD: 0 K/UL (ref 0–0.01)
NRBC BLD-RTO: 0 PER 100 WBC
PLATELET # BLD AUTO: 285 K/UL (ref 150–400)
PMV BLD AUTO: 10.6 FL (ref 8.9–12.9)
RBC # BLD AUTO: 3.71 M/UL (ref 3.8–5.2)
SERVICE CMNT-IMP: NORMAL
WBC # BLD AUTO: 7.9 K/UL (ref 3.6–11)

## 2022-05-20 PROCEDURE — 85025 COMPLETE CBC W/AUTO DIFF WBC: CPT

## 2022-05-20 PROCEDURE — 74011250636 HC RX REV CODE- 250/636: Performed by: STUDENT IN AN ORGANIZED HEALTH CARE EDUCATION/TRAINING PROGRAM

## 2022-05-20 PROCEDURE — 74011250637 HC RX REV CODE- 250/637: Performed by: STUDENT IN AN ORGANIZED HEALTH CARE EDUCATION/TRAINING PROGRAM

## 2022-05-20 PROCEDURE — 65410000002 HC RM PRIVATE OB

## 2022-05-20 PROCEDURE — 36415 COLL VENOUS BLD VENIPUNCTURE: CPT

## 2022-05-20 PROCEDURE — 82962 GLUCOSE BLOOD TEST: CPT

## 2022-05-20 PROCEDURE — 74011250636 HC RX REV CODE- 250/636: Performed by: OBSTETRICS & GYNECOLOGY

## 2022-05-20 RX ORDER — ENOXAPARIN SODIUM 100 MG/ML
30 INJECTION SUBCUTANEOUS 2 TIMES DAILY
Status: DISCONTINUED | OUTPATIENT
Start: 2022-05-20 | End: 2022-05-21

## 2022-05-20 RX ADMIN — HYDROMORPHONE HYDROCHLORIDE 2 MG: 2 TABLET ORAL at 01:59

## 2022-05-20 RX ADMIN — DOCUSATE SODIUM 100 MG: 100 CAPSULE, LIQUID FILLED ORAL at 11:51

## 2022-05-20 RX ADMIN — SODIUM CHLORIDE, POTASSIUM CHLORIDE, SODIUM LACTATE AND CALCIUM CHLORIDE 125 ML/HR: 600; 310; 30; 20 INJECTION, SOLUTION INTRAVENOUS at 02:00

## 2022-05-20 RX ADMIN — HYDROMORPHONE HYDROCHLORIDE 2 MG: 2 TABLET ORAL at 15:42

## 2022-05-20 RX ADMIN — IBUPROFEN 800 MG: 400 TABLET, FILM COATED ORAL at 12:19

## 2022-05-20 RX ADMIN — HYDROMORPHONE HYDROCHLORIDE 2 MG: 2 TABLET ORAL at 22:05

## 2022-05-20 RX ADMIN — IBUPROFEN 800 MG: 400 TABLET, FILM COATED ORAL at 22:05

## 2022-05-20 RX ADMIN — HYDROMORPHONE HYDROCHLORIDE 2 MG: 2 TABLET ORAL at 11:51

## 2022-05-20 RX ADMIN — IBUPROFEN 800 MG: 400 TABLET, FILM COATED ORAL at 05:05

## 2022-05-20 RX ADMIN — ENOXAPARIN SODIUM 30 MG: 100 INJECTION SUBCUTANEOUS at 11:48

## 2022-05-20 NOTE — ROUTINE PROCESS
Bedside and Verbal shift change report given to LUANNE Bellamy RN (oncoming nurse) by Yaa Montes RN (offgoing nurse). Report included the following information SBAR, Kardex, Intake/Output, MAR, Recent Results and Med Rec Status.

## 2022-05-20 NOTE — PROGRESS NOTES
Post-Operative  Day 1    Jemma Maguire     Assessment: Post-Op day 1, stable s/p PLTCS at 38w2d for fetal intolerance of labor during IOL for GDMA2 and low CONOR. Hgb 11 > 10.4. Fingerstick this AM 92. Plan:     - Routine post-operative care. - The risks and benefits of the circumcision  procedure and anesthesia including: bleeding, infection, variability of cosmetic results were discussed at length with the mother. She is aware that future repeat procedures may be necessary. She gives informed consent to proceed as noted and her questions are answered. Held today for low infant blood sugars and feeding issues, will reassess tomorrow. - Postop hemoglobin stable. Plan to start Fe at discharge if pt anemic.  - Ambulate today. - lovenox while in  House for DVT ppx       Information for the patient's :  Elizabeth  [[id number]]     Patient doing well without significant complaint. Tolerating diet. [place] out. Ambulating. Vitals:  Visit Vitals  /70 (BP 1 Location: Right upper arm, BP Patient Position: At rest)   Pulse 94   Temp 98.3 °F (36.8 °C)   Resp 16   Ht 5' 3\" (1.6 m)   Wt 133.8 kg (295 lb)   SpO2 96%   BMI 52.26 kg/m²     Temp (24hrs), Av.2 °F (36.8 °C), Min:98 °F (36.7 °C), Max:98.6 °F (37 °C)      Last 24hr Input/Output:    Intake/Output Summary (Last 24 hours) at 2022 1030  Last data filed at 2022 0725  Gross per 24 hour   Intake 2200 ml   Output 2150 ml   Net 50 ml          Exam:     Patient without distress. Fundus firm, nontender per nursing fundal checks. Incision bandaged, clean, dry, intact. Perineum with normal lochia noted per nursing assessment. Lower extremities are negative for pathological edema.     Labs:   Lab Results   Component Value Date/Time    WBC 7.9 2022 04:26 AM    WBC 8.3 2022 04:24 PM    HGB 10.4 (L) 2022 04:26 AM    HGB 11.0 (L) 2022 04:24 PM    HCT 32.6 (L) 05/20/2022 04:26 AM    HCT 33.3 (L) 05/18/2022 04:24 PM    PLATELET 736 79/60/9438 04:26 AM    PLATELET 474 05/45/9008 04:24 PM       Recent Results (from the past 24 hour(s))   GLUCOSE, POC    Collection Time: 05/19/22 12:56 PM   Result Value Ref Range    Glucose (POC) 90 65 - 117 mg/dL    Performed by Monty Sahni (BRANDIN RN)    CBC WITH AUTOMATED DIFF    Collection Time: 05/20/22  4:26 AM   Result Value Ref Range    WBC 7.9 3.6 - 11.0 K/uL    RBC 3.71 (L) 3.80 - 5.20 M/uL    HGB 10.4 (L) 11.5 - 16.0 g/dL    HCT 32.6 (L) 35.0 - 47.0 %    MCV 87.9 80.0 - 99.0 FL    MCH 28.0 26.0 - 34.0 PG    MCHC 31.9 30.0 - 36.5 g/dL    RDW 14.2 11.5 - 14.5 %    PLATELET 211 276 - 140 K/uL    MPV 10.6 8.9 - 12.9 FL    NRBC 0.0 0  WBC    ABSOLUTE NRBC 0.00 0.00 - 0.01 K/uL    NEUTROPHILS 70 32 - 75 %    LYMPHOCYTES 23 12 - 49 %    MONOCYTES 6 5 - 13 %    EOSINOPHILS 1 0 - 7 %    BASOPHILS 0 0 - 1 %    IMMATURE GRANULOCYTES 0 0.0 - 0.5 %    ABS. NEUTROPHILS 5.4 1.8 - 8.0 K/UL    ABS. LYMPHOCYTES 1.8 0.8 - 3.5 K/UL    ABS. MONOCYTES 0.5 0.0 - 1.0 K/UL    ABS. EOSINOPHILS 0.1 0.0 - 0.4 K/UL    ABS. BASOPHILS 0.0 0.0 - 0.1 K/UL    ABS. IMM.  GRANS. 0.0 0.00 - 0.04 K/UL    DF AUTOMATED     GLUCOSE, POC    Collection Time: 05/20/22  7:11 AM   Result Value Ref Range    Glucose (POC) 92 65 - 117 mg/dL    Performed by Emilie Gonzalez RN

## 2022-05-20 NOTE — LACTATION NOTE
This note was copied from a baby's chart. 22 1330   Visit Information   Lactation Consult Visit Type IP Initial Consult   Visit Length 45 minutes   Reason for Visit Normal [place] Visit;Education   Breast- Feeding Assessment   Breast-Feeding Experience No; Mother has a Spectra breast pump; Measured for 18mm flanges and educated on how to order   Breast Assessment   Left Breast Medium; Wide angle   Left Nipple Everted   Right Breast Medium; Wide angle   Right Nipple Everted     Reviewed the \"Your Guide to Breastfeeding\" booklet. Discussed the typical feeding characteristics in the 1st and 2nd DOL and signs of adequate intake. Baby experiencing low blood sugar at the time of this assessment and disinterested in breastfeeding. Recommended to mother to bottle feed baby formula each feed. Discussed the supply and demand concept of breast milk production and recommended pumping after each feeding and offer skin to skin. Plan:  Offer lots of skin to skin. Pace bottle feeding baby formula each feeding. Pump breasts for 15 minutes. Monitor wet and dirty diapers.

## 2022-05-20 NOTE — PROGRESS NOTES
Pt ambulated to bathroom with two staff assist without difficulty. Pt received Dial soap bath and lira care at this time with CHG wipes. Pt ambulated back to bed with one staff assist without difficulty.

## 2022-05-20 NOTE — PROGRESS NOTES
P&T-Approved DVT Prophylaxis Dosing    Per P&T Committee-approved protocol Enoxaparin has been adjusted to 30 mg SQ BID based on weight and renal function as shown in the table below.          Isidoro Eduardo

## 2022-05-20 NOTE — PROGRESS NOTES
Duramorph Follow-Up Note    1 Day Post-Op sp Procedure(s):   SECTION. /70 (BP 1 Location: Right upper arm, BP Patient Position: At rest)   Pulse 94   Temp 36.8 °C (98.3 °F)   Resp 16   Ht 5' 3\" (1.6 m)   Wt 133.8 kg (295 lb)   SpO2 96%   BMI 52.26 kg/m² . Patient is POD-1 S/P intrathecal duramorph. Pain is well controlled  Patient reports no headache, fever, weakness or numbness. Epidural/spinal tap site is clean, dry and intact. No obvious Anesthesia complications noted. Plan:    Pain management as per primary service.

## 2022-05-21 PROCEDURE — 74011250637 HC RX REV CODE- 250/637: Performed by: STUDENT IN AN ORGANIZED HEALTH CARE EDUCATION/TRAINING PROGRAM

## 2022-05-21 PROCEDURE — 74011250636 HC RX REV CODE- 250/636: Performed by: OBSTETRICS & GYNECOLOGY

## 2022-05-21 PROCEDURE — 74011250637 HC RX REV CODE- 250/637: Performed by: OBSTETRICS & GYNECOLOGY

## 2022-05-21 PROCEDURE — 65410000002 HC RM PRIVATE OB

## 2022-05-21 RX ORDER — ENOXAPARIN SODIUM 100 MG/ML
40 INJECTION SUBCUTANEOUS EVERY 24 HOURS
Status: DISCONTINUED | OUTPATIENT
Start: 2022-05-22 | End: 2022-05-22 | Stop reason: HOSPADM

## 2022-05-21 RX ORDER — HYDROMORPHONE HYDROCHLORIDE 2 MG/1
3 TABLET ORAL
Status: DISCONTINUED | OUTPATIENT
Start: 2022-05-21 | End: 2022-05-21

## 2022-05-21 RX ORDER — HYDROMORPHONE HYDROCHLORIDE 2 MG/1
4 TABLET ORAL
Status: DISCONTINUED | OUTPATIENT
Start: 2022-05-21 | End: 2022-05-22 | Stop reason: HOSPADM

## 2022-05-21 RX ADMIN — HYDROMORPHONE HYDROCHLORIDE 4 MG: 2 TABLET ORAL at 23:35

## 2022-05-21 RX ADMIN — ACETAMINOPHEN 325MG 650 MG: 325 TABLET ORAL at 08:56

## 2022-05-21 RX ADMIN — ACETAMINOPHEN 325MG 650 MG: 325 TABLET ORAL at 00:08

## 2022-05-21 RX ADMIN — IBUPROFEN 800 MG: 400 TABLET, FILM COATED ORAL at 23:36

## 2022-05-21 RX ADMIN — IBUPROFEN 800 MG: 400 TABLET, FILM COATED ORAL at 14:52

## 2022-05-21 RX ADMIN — ENOXAPARIN SODIUM 30 MG: 100 INJECTION SUBCUTANEOUS at 08:56

## 2022-05-21 RX ADMIN — IBUPROFEN 800 MG: 400 TABLET, FILM COATED ORAL at 07:07

## 2022-05-21 RX ADMIN — ACETAMINOPHEN 325MG 650 MG: 325 TABLET ORAL at 23:35

## 2022-05-21 RX ADMIN — DOCUSATE SODIUM 100 MG: 100 CAPSULE, LIQUID FILLED ORAL at 19:32

## 2022-05-21 RX ADMIN — ACETAMINOPHEN 325MG 650 MG: 325 TABLET ORAL at 19:32

## 2022-05-21 RX ADMIN — HYDROMORPHONE HYDROCHLORIDE 4 MG: 2 TABLET ORAL at 19:32

## 2022-05-21 RX ADMIN — ENOXAPARIN SODIUM 30 MG: 100 INJECTION SUBCUTANEOUS at 00:08

## 2022-05-21 RX ADMIN — HYDROMORPHONE HYDROCHLORIDE 3 MG: 2 TABLET ORAL at 10:45

## 2022-05-21 RX ADMIN — HYDROMORPHONE HYDROCHLORIDE 2 MG: 2 TABLET ORAL at 02:02

## 2022-05-21 RX ADMIN — HYDROMORPHONE HYDROCHLORIDE 2 MG: 2 TABLET ORAL at 07:07

## 2022-05-21 RX ADMIN — ACETAMINOPHEN 325MG 650 MG: 325 TABLET ORAL at 14:53

## 2022-05-21 RX ADMIN — HYDROMORPHONE HYDROCHLORIDE 3 MG: 2 TABLET ORAL at 14:53

## 2022-05-21 RX ADMIN — DOCUSATE SODIUM 100 MG: 100 CAPSULE, LIQUID FILLED ORAL at 08:56

## 2022-05-21 NOTE — LACTATION NOTE
This note was copied from a baby's chart. 22 1518   Visit Information   Lactation Consult Visit Type IP Consult Follow Up   Visit Length 30 minutes   Reason for Visit Normal [place] Visit;Education   Breast- Feeding Assessment   Breast-Feeding Experience No; Mother has a Spectra breast pump; Measured for 18mm flanges and educated on how to order   Breast Assessment   Left Breast Medium; Wide angle   Left Nipple Everted   Right Breast Medium; Wide angle   Right Nipple Everted   Mother/Infant Observation   Mother Observation Breast comfortable;Close hold   Infant Observation Lips flanged, lower; Lips flanged, upper;Breast tissue moves; Latches nipple and aereolae;Opens mouth  (Baby has a anterior lingual frenulum about 3mm from the tongue tip affecting ROM; High palate   LATCH Documentation   Latch 2   Audible Swallowing 0   Type of Nipple 2   Comfort (Breast/Nipple) 2   Hold (Positioning) 1   LATCH Score 7     Observed baby showing signs of hunger and assisted mother in a comfortable position to breastfeed. Performed and oral assessment prior to feeding and discussed with parents. Baby eager to latch, however mother had just pumped and he did not maintain the latch. He is being supplemented each feeding. Reviewed the supply and demand concept of breast milk production with mother and the importance of pumping her breasts as long as baby is being supplemented. Mother was supplied with information on TOT and was given the opportunity to ask questions. Plan:  Feed baby at early signs of hunger every 2-3 hours. Offer the breast as mother desires. Supplement with EBM/formula each feeding. Pump breasts for 15 minutes. Monitor wet and dirty diapers for signs of adequate intake.

## 2022-05-21 NOTE — ROUTINE PROCESS
Bedside and Verbal shift change report given to [employer] Report included the following information: SBAR, Kardex, Intake/Output, MAR, Recent Results and Med Rec Status. Opportunity for questions and clarification was provided.

## 2022-05-21 NOTE — PROGRESS NOTES
Post-Operative  Day 2    Yeny Brittany     Assessment: Post-Op day 2, doing well s/p PLTCS at 38w2d for fetal intolerance of labor during IOL for GDMA2 and low CONOR. Hgb 11 > 10.4. Fingerstick yesterday AM 92. This morning, pt was tearful and felt that her pain was not well controlled. She is currently on motrin 800 q8 and dilaudid 2mg q4hrs. We discussed increasing her dialudid to 3mg q4hrs, and I think could probably go up to 4mg if she feels she needs it. I have asked her nurse to get her an abdominal binder which I think will be helpful to help her move more. I have encouraged her to increase her ambulation. Plan:   - Routine post-operative care. - Postop hemoglobin stable. Plan to start Fe at discharge if pt anemic.  - Ambulate today. - lovenox while in  House for DVT ppx   - Plan for discharge [address] Discharge Date: Tomorrow. Information for the patient's :  Oliverio Holman [[id number]]     Patient doing well without significant complaint. Tolerating regular diet. Ambulating. Voiding without difficulty. Vitals:  Visit Vitals  BP (!) 147/91 (BP 1 Location: Left upper arm, BP Patient Position: Lying) Comment: Dr. Dg Caballero aware and requested a re-check in 1hr   Pulse 93   Temp 99.1 °F (37.3 °C)   Resp 16   Ht 5' 3\" (1.6 m)   Wt 133.8 kg (295 lb)   SpO2 100%   BMI 52.26 kg/m²     Temp (24hrs), Av.5 °F (36.9 °C), Min:98 °F (36.7 °C), Max:99.1 °F (37.3 °C)        Exam:       Patient without distress. Fundus firm, nontender per nursing fundal checks. Incision bandaged. Clean, dry, intact. Perineum with normal lochia noted per nursing assessment. Lower extremities are negative for pathological edema.     Labs:   Lab Results   Component Value Date/Time    WBC 7.9 2022 04:26 AM    WBC 8.3 2022 04:24 PM    HGB 10.4 (L) 2022 04:26 AM    HGB 11.0 (L) 2022 04:24 PM    HCT 32.6 (L) 2022 04:26 AM    HCT 33.3 (L) 05/18/2022 04:24 PM    PLATELET 237 04/69/1275 04:26 AM    PLATELET 999 06/09/1469 04:24 PM       No results found for this or any previous visit (from the past 24 hour(s)).

## 2022-05-22 PROCEDURE — 74011250637 HC RX REV CODE- 250/637: Performed by: OBSTETRICS & GYNECOLOGY

## 2022-05-22 PROCEDURE — 74011250637 HC RX REV CODE- 250/637: Performed by: STUDENT IN AN ORGANIZED HEALTH CARE EDUCATION/TRAINING PROGRAM

## 2022-05-22 PROCEDURE — 74011250636 HC RX REV CODE- 250/636: Performed by: OBSTETRICS & GYNECOLOGY

## 2022-05-22 RX ORDER — HYDROMORPHONE HYDROCHLORIDE 2 MG/1
3 TABLET ORAL
Qty: 30 TABLET | Refills: 0 | Status: SHIPPED | OUTPATIENT
Start: 2022-05-22 | End: 2022-05-29

## 2022-05-22 RX ORDER — DOCUSATE SODIUM 100 MG/1
100 CAPSULE, LIQUID FILLED ORAL 2 TIMES DAILY
Qty: 60 CAPSULE | Refills: 2 | Status: SHIPPED | OUTPATIENT
Start: 2022-05-22 | End: 2022-08-20

## 2022-05-22 RX ORDER — IBUPROFEN 800 MG/1
800 TABLET ORAL
Qty: 60 TABLET | Refills: 0 | Status: SHIPPED | OUTPATIENT
Start: 2022-05-22

## 2022-05-22 RX ORDER — ACETAMINOPHEN 500 MG
1000 TABLET ORAL
Qty: 60 TABLET | Refills: 0 | Status: SHIPPED | OUTPATIENT
Start: 2022-05-22

## 2022-05-22 RX ADMIN — ACETAMINOPHEN 325MG 650 MG: 325 TABLET ORAL at 12:54

## 2022-05-22 RX ADMIN — DOCUSATE SODIUM 100 MG: 100 CAPSULE, LIQUID FILLED ORAL at 08:38

## 2022-05-22 RX ADMIN — ENOXAPARIN SODIUM 40 MG: 40 INJECTION SUBCUTANEOUS at 08:38

## 2022-05-22 RX ADMIN — HYDROMORPHONE HYDROCHLORIDE 4 MG: 2 TABLET ORAL at 08:38

## 2022-05-22 RX ADMIN — HYDROMORPHONE HYDROCHLORIDE 4 MG: 2 TABLET ORAL at 12:54

## 2022-05-22 RX ADMIN — IBUPROFEN 800 MG: 400 TABLET, FILM COATED ORAL at 08:38

## 2022-05-22 RX ADMIN — ACETAMINOPHEN 325MG 650 MG: 325 TABLET ORAL at 08:38

## 2022-05-22 RX ADMIN — ACETAMINOPHEN 325MG 650 MG: 325 TABLET ORAL at 03:56

## 2022-05-22 RX ADMIN — HYDROMORPHONE HYDROCHLORIDE 4 MG: 2 TABLET ORAL at 03:56

## 2022-05-22 NOTE — PROGRESS NOTES
Post-Operative  Day 3    Jemma Maguire       Assessment: Post-Op day 3, doing well s/p PLTCS at 38w2d for fetal intolerance of labor during IOL for GDMA2 and low CONOR.  Hgb 11 > 10.4. Yesterday BP milldly elevated in setting of pain not controlled and patient tearful. Increased dilaudid to 3mg yesterday and she is doing much better today. Bps appropriate. Plan:   - Discharge home today. - Follow up in office in 1 week(s) with [facility].  - Pain medication prescription(s) sent. - Questions answered. Information for the patient's :  Yumi Lundberg [[id number]]    Patient doing well without significant complaint. Tolerating regular diet. Ambulating. Voiding without difficulty. Vitals:  Visit Vitals  /79 (BP 1 Location: Right upper arm)   Pulse 81   Temp 98.6 °F (37 °C)   Resp 16   Ht 5' 3\" (1.6 m)   Wt 133.8 kg (295 lb)   SpO2 98%   BMI 52.26 kg/m²     Temp (24hrs), Av.2 °F (36.8 °C), Min:97.7 °F (36.5 °C), Max:98.6 °F (37 °C)        Exam:       Patient without distress. Fundus firm, nontender per nursing fundal checks. Incision bandaged. Clean, dry, intact. Perineum with normal lochia noted per nursing assessment. Lower extremities are negative for pathological edema. Labs:   Lab Results   Component Value Date/Time    WBC 7.9 2022 04:26 AM    WBC 8.3 2022 04:24 PM    HGB 10.4 (L) 2022 04:26 AM    HGB 11.0 (L) 2022 04:24 PM    HCT 32.6 (L) 2022 04:26 AM    HCT 33.3 (L) 2022 04:24 PM    PLATELET 515  04:26 AM    PLATELET 136  04:24 PM       No results found for this or any previous visit (from the past 24 hour(s)).

## 2022-05-22 NOTE — DISCHARGE SUMMARY
Obstetrical Discharge Summary     Name: Geo Braun MRN: [id number]  SSN: [id number]    YOB: 1990  Age: 32 y.o. Sex: female      Admit Date: 2022    Discharge Date: 2022     Admitting Physician: Livia Ahumada MD     Attending Physician:  Julissa Torres*     Admission Diagnoses: Gestational diabetes [O24.419]    Discharge Diagnoses:   Information for the patient's :  Perla New [[id number]]   Delivery of a 3.355 kg male infant via  on 2022 at 2:29 PM  by Danielle Meredith. Apgars were 9  and 9 . Additional Diagnoses:   Hospital Problems  Date Reviewed: 2022          Codes Class Noted POA    Gestational diabetes ICD-10-CM: O24.419  ICD-9-CM: 648.80  2022 Unknown             Lab Results   Component Value Date/Time    Rubella, External Immune 2.98 10/21/2021 12:00 AM    GrBStrep, External Positive 2022 12:00 AM       Hospital Course: Normal hospital course following the delivery. Patient Instructions:   Current Discharge Medication List      START taking these medications    Details   ibuprofen (MOTRIN) 800 mg tablet Take 1 Tablet by mouth every eight (8) hours as needed for Pain. Qty: 60 Tablet, Refills: 0      acetaminophen (Acetaminophen Extra Strength) 500 mg tablet Take 2 Tablets by mouth every eight (8) hours as needed for Pain. Qty: 60 Tablet, Refills: 0      HYDROmorphone (Dilaudid) 2 mg tablet Take 1.5 Tablets by mouth every four (4) hours as needed for Pain for up to 7 days. Max Daily Amount: 18 mg. Qty: 30 Tablet, Refills: 0    Associated Diagnoses: S/P       docusate sodium (Colace) 100 mg capsule Take 1 Capsule by mouth two (2) times a day for 90 days.   Qty: 60 Capsule, Refills: 2         STOP taking these medications       metFORMIN (GLUMETZA) 1,000 mg TG24 24 hour tablet Comments:   Reason for Stopping:         methocarbamol (ROBAXIN) 500 mg tablet Comments:   Reason for Stopping:         naproxen (NAPROSYN) 500 mg tablet Comments:   Reason for Stopping:         HYDROcodone-acetaminophen (NORCO) 5-325 mg per tablet Comments:   Reason for Stopping:               Disposition at Discharge: Home or self care    Condition at Discharge: Stable    Reference my discharge instructions. Follow-up Appointments   Procedures    FOLLOW UP VISIT Appointment in: One Week     Standing Status:   Standing     Number of Occurrences:   1     Order Specific Question:   Appointment in     Answer:    One Week        Signed By:  Jose Verde MD     May 22, 2022

## 2022-05-22 NOTE — PROGRESS NOTES
[id number] - Assumed care of pt from LUANNE Lara, RN    1210 - Pt given discharge paperwork, given time to ask questions, she verbalized understanding, pt was discharged home with fob and baby.

## 2022-05-25 ENCOUNTER — TELEPHONE (OUTPATIENT)
Dept: MOTHER INFANT UNIT | Age: 32
End: 2022-05-25

## 2022-05-25 NOTE — TELEPHONE ENCOUNTER
Mother states that baby is not latching well. He is being supplemented with formula and EBM. Mother is pumping for up to 30 minutes and obtaining 1oz. Reviewed the supply and demand concept of breast milk production and the importance of continuing to pump every 2-3 hours for 15-20 minutes. She was given the opportunity to ask questions and plans to meet with the [facility] at her Peds office tomorrow.

## 2022-06-01 VITALS
SYSTOLIC BLOOD PRESSURE: 133 MMHG | OXYGEN SATURATION: 98 % | HEART RATE: 81 BPM | BODY MASS INDEX: 51.91 KG/M2 | TEMPERATURE: 98.6 F | WEIGHT: 293 LBS | HEIGHT: 63 IN | RESPIRATION RATE: 16 BRPM | DIASTOLIC BLOOD PRESSURE: 79 MMHG

## 2022-08-22 NOTE — TELEPHONE ENCOUNTER
Pt called office stated she received a letter concerning her results. Pt told per Dr. Plummer Kind her Results of colposcopic biopsies showed low grade changes. Will repeat cotesting in 12 months. Pt voiced understanding. Pt stated she was dismissed from [facility] by letter, and was told to [facility] with the [facility]. Pt told she might want to contact [facility] for clarification, but pt was reminded to be sure to get her Pap repeated in one year. Consent 2/Introductory Paragraph: Mohs surgery was explained to the patient and consent was obtained. The risks, benefits and alternatives to therapy were discussed in detail. Specifically, the risks of infection, scarring, bleeding, prolonged wound healing, incomplete removal, allergy to anesthesia, nerve injury and recurrence were addressed. Prior to the procedure, the treatment site was clearly identified and confirmed by the patient. All components of Universal Protocol/PAUSE Rule completed.

## 2023-02-17 NOTE — LETTER
September 26, 2018 [address] [facility] [id number] Dear Isidro Acosta: 
Thank you for requesting access to [employer]. Please follow the instructions below to securely access and download your online medical record. [employer] allows you to send messages to your doctor, view your test results, renew your prescriptions, schedule appointments, and more. How Do I Sign Up? 1. In your internet browser, go to [email or web address]. 2. Click on the First Time User? Click Here link in the Sign In box. You will see the New Member Sign Up page. 3. Enter your [employer] Access Code exactly as it appears below. You will not need to use this code after youve completed the sign-up process. If you do not sign up before the expiration date, you must request a new code. [employer] Access Code: [email or web address] Expires: 11/29/2018  2:25 PM  
 
4. Enter the last four digits of your Social Security Number (xxxx) and Date of Birth (mm/dd/yyyy) as indicated and click Submit. You will be taken to the next sign-up page. 5. Create a [employer] ID. This will be your [employer] login ID and cannot be changed, so think of one that is secure and easy to remember. 6. Create a [employer] password. You can change your password at any time. 7. Enter your Password Reset Question and Answer. This can be used at a later time if you forget your password. 8. Enter your e-mail address. You will receive e-mail notification when new information is available in [address] 9. Click Sign Up. You can now view and download portions of your medical record. 10. Click the Download Summary menu link to download a portable copy of your medical information. Additional Information If you have questions, please visit the Frequently Asked Questions section of the [employer] website at [email or web address] Remember, [employer] is NOT to be used for urgent needs. For medical emergencies, dial 911. Now available from your iPhone and Android! Sincerely, [employer] 
 Bleeding that does not stop/Pain not relieved by Medications/Fever greater than (need to indicate Fahrenheit or Celsius)

## 2023-05-24 RX ORDER — ACETAMINOPHEN 500 MG
1000 TABLET ORAL EVERY 8 HOURS PRN
COMMUNITY
Start: 2022-05-22

## 2023-05-24 RX ORDER — IBUPROFEN 800 MG/1
800 TABLET ORAL EVERY 8 HOURS PRN
COMMUNITY
Start: 2022-05-22

## (undated) DEVICE — PREP SKN CHLRAPRP APL 26ML STR --

## (undated) DEVICE — STRAP,POSITIONING,KNEE/BODY,FOAM,4X60": Brand: MEDLINE

## (undated) DEVICE — PENCIL ES L3M BTTN SWCH S STL HEX LOK BLDE ELECTRD HOLSTER

## (undated) DEVICE — SUTURE PLN GUT SZ 3-0 L27IN ABSRB YELLOWISH TAN L70MM XLH 52T

## (undated) DEVICE — 3000CC GUARDIAN II: Brand: GUARDIAN

## (undated) DEVICE — REM POLYHESIVE ADULT PATIENT RETURN ELECTRODE: Brand: VALLEYLAB

## (undated) DEVICE — SOLIDIFIER FLD 2OZ 1500CC N DISINF IN BTL DISP SAFESORB

## (undated) DEVICE — SUTURE MCRYL SZ 3-0 L27IN ABSRB UD L19MM PS-2 3/8 CIR PRIM Y427H

## (undated) DEVICE — SUTURE VCRL SZ 1 L27IN ABSRB VLT L36MM CT-1 1/2 CIR J341H

## (undated) DEVICE — SOLUTION IV 1000ML 0.9% SOD CHL

## (undated) DEVICE — STERILE LATEX POWDER-FREE SURGICAL GLOVESWITH NITRILE COATING: Brand: PROTEXIS

## (undated) DEVICE — SUTURE VCRL SZ 0 L36IN ABSRB VLT L40MM CT 1/2 CIR J358H

## (undated) DEVICE — MEDI-VAC NON-CONDUCTIVE SUCTION TUBING: Brand: CARDINAL HEALTH

## (undated) DEVICE — C-SECTION II-LF: Brand: MEDLINE INDUSTRIES, INC.

## (undated) DEVICE — SPONGE COUNTING BAG: Brand: DEVON